# Patient Record
Sex: FEMALE | Race: BLACK OR AFRICAN AMERICAN | NOT HISPANIC OR LATINO | Employment: UNEMPLOYED | URBAN - METROPOLITAN AREA
[De-identification: names, ages, dates, MRNs, and addresses within clinical notes are randomized per-mention and may not be internally consistent; named-entity substitution may affect disease eponyms.]

---

## 2023-08-30 PROBLEM — M54.9 DORSODYNIA: Status: ACTIVE | Noted: 2023-08-30

## 2023-08-30 PROBLEM — E21.3 HYPERPARATHYROIDISM (MULTI): Status: ACTIVE | Noted: 2023-08-30

## 2023-08-30 PROBLEM — E55.9 VITAMIN D DEFICIENCY: Status: ACTIVE | Noted: 2023-08-30

## 2023-08-30 PROBLEM — B37.89 CANDIDIASIS OF BREAST: Status: ACTIVE | Noted: 2023-08-30

## 2023-08-30 PROBLEM — E66.01 MORBID OBESITY (MULTI): Status: ACTIVE | Noted: 2023-08-30

## 2023-08-30 PROBLEM — B96.89 BV (BACTERIAL VAGINOSIS): Status: ACTIVE | Noted: 2023-08-30

## 2023-08-30 PROBLEM — N62 LARGE BREASTS: Status: ACTIVE | Noted: 2023-08-30

## 2023-08-30 PROBLEM — Z98.890 POST-OPERATIVE STATE: Status: ACTIVE | Noted: 2023-08-30

## 2023-08-30 PROBLEM — N76.0 VAGINITIS: Status: ACTIVE | Noted: 2023-08-30

## 2023-08-30 PROBLEM — N93.0 POSTCOITAL BLEEDING: Status: ACTIVE | Noted: 2023-08-30

## 2023-08-30 PROBLEM — T81.89XA DELAYED SURGICAL WOUND HEALING: Status: ACTIVE | Noted: 2023-08-30

## 2023-08-30 PROBLEM — Z98.890 STATUS POST BILATERAL BREAST REDUCTION: Status: ACTIVE | Noted: 2023-08-30

## 2023-08-30 PROBLEM — L29.2 PRURITUS OF VULVA: Status: ACTIVE | Noted: 2023-08-30

## 2023-08-30 PROBLEM — O09.219 PREVIOUS PRETERM DELIVERY, ANTEPARTUM (HHS-HCC): Status: ACTIVE | Noted: 2023-08-30

## 2023-08-30 PROBLEM — A74.9 CHLAMYDIA: Status: ACTIVE | Noted: 2023-08-30

## 2023-08-30 PROBLEM — N76.0 BV (BACTERIAL VAGINOSIS): Status: ACTIVE | Noted: 2023-08-30

## 2023-08-30 PROBLEM — N72 CERVICITIS: Status: ACTIVE | Noted: 2023-08-30

## 2023-08-30 PROBLEM — K90.9 MALABSORPTION (HHS-HCC): Status: ACTIVE | Noted: 2023-08-30

## 2023-08-30 PROBLEM — M62.830 BACK MUSCLE SPASM: Status: ACTIVE | Noted: 2023-08-30

## 2023-08-30 PROBLEM — G56.00 CARPAL TUNNEL SYNDROME: Status: ACTIVE | Noted: 2023-08-30

## 2023-08-30 PROBLEM — N94.6 DYSMENORRHEA: Status: ACTIVE | Noted: 2023-08-30

## 2023-08-30 PROBLEM — I10 HYPERTENSION: Status: ACTIVE | Noted: 2023-08-30

## 2023-08-30 PROBLEM — B00.9 HSV (HERPES SIMPLEX VIRUS) INFECTION: Status: ACTIVE | Noted: 2023-08-30

## 2023-08-30 PROBLEM — O09.529 AMA (ADVANCED MATERNAL AGE) MULTIGRAVIDA 35+ (HHS-HCC): Status: ACTIVE | Noted: 2023-08-30

## 2023-09-11 PROBLEM — R87.611 PAP SMEAR OF CERVIX WITH ASCUS, CANNOT EXCLUDE HGSIL: Status: ACTIVE | Noted: 2023-09-11

## 2023-09-11 PROBLEM — R89.6: Status: ACTIVE | Noted: 2023-09-11

## 2023-09-11 RX ORDER — IBUPROFEN 800 MG/1
800 TABLET ORAL EVERY 6 HOURS PRN
COMMUNITY
Start: 2023-05-13 | End: 2024-02-22 | Stop reason: HOSPADM

## 2024-02-21 ENCOUNTER — APPOINTMENT (OUTPATIENT)
Dept: CARDIOLOGY | Facility: HOSPITAL | Age: 44
End: 2024-02-21
Payer: COMMERCIAL

## 2024-02-21 ENCOUNTER — HOSPITAL ENCOUNTER (INPATIENT)
Facility: HOSPITAL | Age: 44
LOS: 1 days | Discharge: HOME | End: 2024-02-22
Attending: INTERNAL MEDICINE | Admitting: INTERNAL MEDICINE
Payer: COMMERCIAL

## 2024-02-21 DIAGNOSIS — I21.4 NSTEMI (NON-ST ELEVATED MYOCARDIAL INFARCTION) (MULTI): Primary | ICD-10-CM

## 2024-02-21 DIAGNOSIS — R79.89 OTHER SPECIFIED ABNORMAL FINDINGS OF BLOOD CHEMISTRY: ICD-10-CM

## 2024-02-21 LAB
ANION GAP SERPL CALC-SCNC: 12 MMOL/L
APTT PPP: 56.7 SECONDS (ref 22–32.5)
ATRIAL RATE: 76 BPM
BUN SERPL-MCNC: 12 MG/DL (ref 8–25)
CALCIUM SERPL-MCNC: 10.1 MG/DL (ref 8.5–10.4)
CHLORIDE SERPL-SCNC: 100 MMOL/L (ref 97–107)
CO2 SERPL-SCNC: 26 MMOL/L (ref 24–31)
CREAT SERPL-MCNC: 0.6 MG/DL (ref 0.4–1.6)
CRP SERPL-MCNC: 0.6 MG/DL (ref 0–2)
EGFRCR SERPLBLD CKD-EPI 2021: >90 ML/MIN/1.73M*2
ERYTHROCYTE [DISTWIDTH] IN BLOOD BY AUTOMATED COUNT: 13.2 % (ref 11.5–14.5)
ERYTHROCYTE [SEDIMENTATION RATE] IN BLOOD BY WESTERGREN METHOD: 44 MM/H (ref 0–20)
GLUCOSE SERPL-MCNC: 92 MG/DL (ref 65–99)
HCT VFR BLD AUTO: 33 % (ref 36–46)
HGB BLD-MCNC: 10.7 G/DL (ref 12–16)
INR PPP: 1.1 (ref 0.9–1.2)
MCH RBC QN AUTO: 29.1 PG (ref 26–34)
MCHC RBC AUTO-ENTMCNC: 32.4 G/DL (ref 32–36)
MCV RBC AUTO: 90 FL (ref 80–100)
NRBC BLD-RTO: 0 /100 WBCS (ref 0–0)
P AXIS: 68 DEGREES
P OFFSET: 199 MS
P ONSET: 155 MS
PLATELET # BLD AUTO: 221 X10*3/UL (ref 150–450)
POTASSIUM SERPL-SCNC: 3.6 MMOL/L (ref 3.4–5.1)
PR INTERVAL: 126 MS
PROTHROMBIN TIME: 11.9 SECONDS (ref 9.3–12.7)
Q ONSET: 218 MS
QRS COUNT: 13 BEATS
QRS DURATION: 82 MS
QT INTERVAL: 376 MS
QTC CALCULATION(BAZETT): 423 MS
QTC FREDERICIA: 406 MS
R AXIS: -10 DEGREES
RBC # BLD AUTO: 3.68 X10*6/UL (ref 4–5.2)
SODIUM SERPL-SCNC: 138 MMOL/L (ref 133–145)
T AXIS: 1 DEGREES
T OFFSET: 406 MS
TROPONIN T SERPL-MCNC: 500 NG/L
VENTRICULAR RATE: 76 BPM
WBC # BLD AUTO: 6.3 X10*3/UL (ref 4.4–11.3)

## 2024-02-21 PROCEDURE — 2500000005 HC RX 250 GENERAL PHARMACY W/O HCPCS: Performed by: INTERNAL MEDICINE

## 2024-02-21 PROCEDURE — 4A023N7 MEASUREMENT OF CARDIAC SAMPLING AND PRESSURE, LEFT HEART, PERCUTANEOUS APPROACH: ICD-10-PCS | Performed by: INTERNAL MEDICINE

## 2024-02-21 PROCEDURE — 93010 ELECTROCARDIOGRAM REPORT: CPT | Performed by: INTERNAL MEDICINE

## 2024-02-21 PROCEDURE — 36415 COLL VENOUS BLD VENIPUNCTURE: CPT | Performed by: INTERNAL MEDICINE

## 2024-02-21 PROCEDURE — 85027 COMPLETE CBC AUTOMATED: CPT | Performed by: INTERNAL MEDICINE

## 2024-02-21 PROCEDURE — 2060000001 HC INTERMEDIATE ICU ROOM DAILY

## 2024-02-21 PROCEDURE — 80048 BASIC METABOLIC PNL TOTAL CA: CPT | Performed by: INTERNAL MEDICINE

## 2024-02-21 PROCEDURE — 2500000001 HC RX 250 WO HCPCS SELF ADMINISTERED DRUGS (ALT 637 FOR MEDICARE OP): Performed by: INTERNAL MEDICINE

## 2024-02-21 PROCEDURE — B2111ZZ FLUOROSCOPY OF MULTIPLE CORONARY ARTERIES USING LOW OSMOLAR CONTRAST: ICD-10-PCS | Performed by: INTERNAL MEDICINE

## 2024-02-21 PROCEDURE — 93005 ELECTROCARDIOGRAM TRACING: CPT

## 2024-02-21 PROCEDURE — 85652 RBC SED RATE AUTOMATED: CPT | Performed by: INTERNAL MEDICINE

## 2024-02-21 PROCEDURE — 2720000007 HC OR 272 NO HCPCS: Performed by: INTERNAL MEDICINE

## 2024-02-21 PROCEDURE — C9113 INJ PANTOPRAZOLE SODIUM, VIA: HCPCS | Performed by: INTERNAL MEDICINE

## 2024-02-21 PROCEDURE — 85347 COAGULATION TIME ACTIVATED: CPT

## 2024-02-21 PROCEDURE — 85610 PROTHROMBIN TIME: CPT | Performed by: INTERNAL MEDICINE

## 2024-02-21 PROCEDURE — 2500000004 HC RX 250 GENERAL PHARMACY W/ HCPCS (ALT 636 FOR OP/ED): Performed by: INTERNAL MEDICINE

## 2024-02-21 PROCEDURE — 99152 MOD SED SAME PHYS/QHP 5/>YRS: CPT | Performed by: INTERNAL MEDICINE

## 2024-02-21 PROCEDURE — 96366 THER/PROPH/DIAG IV INF ADDON: CPT | Mod: 59

## 2024-02-21 PROCEDURE — C1894 INTRO/SHEATH, NON-LASER: HCPCS | Performed by: INTERNAL MEDICINE

## 2024-02-21 PROCEDURE — 86140 C-REACTIVE PROTEIN: CPT | Performed by: INTERNAL MEDICINE

## 2024-02-21 PROCEDURE — 84484 ASSAY OF TROPONIN QUANT: CPT | Performed by: INTERNAL MEDICINE

## 2024-02-21 PROCEDURE — 93458 L HRT ARTERY/VENTRICLE ANGIO: CPT | Performed by: INTERNAL MEDICINE

## 2024-02-21 PROCEDURE — C1887 CATHETER, GUIDING: HCPCS | Performed by: INTERNAL MEDICINE

## 2024-02-21 PROCEDURE — 2550000001 HC RX 255 CONTRASTS: Performed by: INTERNAL MEDICINE

## 2024-02-21 PROCEDURE — G0378 HOSPITAL OBSERVATION PER HR: HCPCS

## 2024-02-21 PROCEDURE — 99222 1ST HOSP IP/OBS MODERATE 55: CPT | Performed by: INTERNAL MEDICINE

## 2024-02-21 PROCEDURE — C1760 CLOSURE DEV, VASC: HCPCS | Performed by: INTERNAL MEDICINE

## 2024-02-21 PROCEDURE — 96375 TX/PRO/DX INJ NEW DRUG ADDON: CPT | Mod: 59

## 2024-02-21 PROCEDURE — 96361 HYDRATE IV INFUSION ADD-ON: CPT | Mod: 59

## 2024-02-21 PROCEDURE — 96365 THER/PROPH/DIAG IV INF INIT: CPT | Mod: 59

## 2024-02-21 RX ORDER — FENTANYL CITRATE 50 UG/ML
INJECTION, SOLUTION INTRAMUSCULAR; INTRAVENOUS AS NEEDED
Status: DISCONTINUED | OUTPATIENT
Start: 2024-02-21 | End: 2024-02-21 | Stop reason: HOSPADM

## 2024-02-21 RX ORDER — IODIXANOL 320 MG/ML
INJECTION, SOLUTION INTRAVASCULAR AS NEEDED
Status: DISCONTINUED | OUTPATIENT
Start: 2024-02-21 | End: 2024-02-21 | Stop reason: HOSPADM

## 2024-02-21 RX ORDER — ATORVASTATIN CALCIUM 80 MG/1
80 TABLET, FILM COATED ORAL NIGHTLY
Status: DISCONTINUED | OUTPATIENT
Start: 2024-02-21 | End: 2024-02-21

## 2024-02-21 RX ORDER — NITROGLYCERIN 20 MG/100ML
20 INJECTION INTRAVENOUS CONTINUOUS
Status: DISCONTINUED | OUTPATIENT
Start: 2024-02-21 | End: 2024-02-21

## 2024-02-21 RX ORDER — PANTOPRAZOLE SODIUM 40 MG/10ML
40 INJECTION, POWDER, LYOPHILIZED, FOR SOLUTION INTRAVENOUS
Status: DISCONTINUED | OUTPATIENT
Start: 2024-02-21 | End: 2024-02-22 | Stop reason: HOSPADM

## 2024-02-21 RX ORDER — ONDANSETRON 4 MG/1
4 TABLET, ORALLY DISINTEGRATING ORAL EVERY 8 HOURS PRN
Status: DISCONTINUED | OUTPATIENT
Start: 2024-02-21 | End: 2024-02-22 | Stop reason: HOSPADM

## 2024-02-21 RX ORDER — MORPHINE SULFATE 2 MG/ML
2 INJECTION, SOLUTION INTRAMUSCULAR; INTRAVENOUS EVERY 5 MIN PRN
Status: DISCONTINUED | OUTPATIENT
Start: 2024-02-21 | End: 2024-02-21

## 2024-02-21 RX ORDER — NAPROXEN SODIUM 220 MG/1
81 TABLET, FILM COATED ORAL DAILY
Status: DISCONTINUED | OUTPATIENT
Start: 2024-02-21 | End: 2024-02-21

## 2024-02-21 RX ORDER — ONDANSETRON HYDROCHLORIDE 2 MG/ML
4 INJECTION, SOLUTION INTRAVENOUS EVERY 8 HOURS PRN
Status: DISCONTINUED | OUTPATIENT
Start: 2024-02-21 | End: 2024-02-22 | Stop reason: HOSPADM

## 2024-02-21 RX ORDER — HEPARIN SODIUM 10000 [USP'U]/100ML
0-4000 INJECTION, SOLUTION INTRAVENOUS CONTINUOUS
Status: DISCONTINUED | OUTPATIENT
Start: 2024-02-21 | End: 2024-02-21

## 2024-02-21 RX ORDER — GUAIFENESIN/DEXTROMETHORPHAN 100-10MG/5
5 SYRUP ORAL EVERY 4 HOURS PRN
Status: DISCONTINUED | OUTPATIENT
Start: 2024-02-21 | End: 2024-02-22 | Stop reason: HOSPADM

## 2024-02-21 RX ORDER — DEXTROSE MONOHYDRATE AND SODIUM CHLORIDE 5; .45 G/100ML; G/100ML
100 INJECTION, SOLUTION INTRAVENOUS CONTINUOUS
Status: DISCONTINUED | OUTPATIENT
Start: 2024-02-21 | End: 2024-02-22 | Stop reason: HOSPADM

## 2024-02-21 RX ORDER — HEPARIN SODIUM 5000 [USP'U]/ML
2000-4000 INJECTION, SOLUTION INTRAVENOUS; SUBCUTANEOUS AS NEEDED
Status: DISCONTINUED | OUTPATIENT
Start: 2024-02-21 | End: 2024-02-21

## 2024-02-21 RX ORDER — PANTOPRAZOLE SODIUM 40 MG/1
40 TABLET, DELAYED RELEASE ORAL
Status: DISCONTINUED | OUTPATIENT
Start: 2024-02-21 | End: 2024-02-22 | Stop reason: HOSPADM

## 2024-02-21 RX ORDER — POLYETHYLENE GLYCOL 3350 17 G/17G
17 POWDER, FOR SOLUTION ORAL DAILY
Status: DISCONTINUED | OUTPATIENT
Start: 2024-02-21 | End: 2024-02-22 | Stop reason: HOSPADM

## 2024-02-21 RX ORDER — GUAIFENESIN 600 MG/1
600 TABLET, EXTENDED RELEASE ORAL EVERY 12 HOURS PRN
Status: DISCONTINUED | OUTPATIENT
Start: 2024-02-21 | End: 2024-02-22 | Stop reason: HOSPADM

## 2024-02-21 RX ORDER — DOCUSATE SODIUM 100 MG/1
100 CAPSULE, LIQUID FILLED ORAL 2 TIMES DAILY
Status: DISCONTINUED | OUTPATIENT
Start: 2024-02-21 | End: 2024-02-22 | Stop reason: HOSPADM

## 2024-02-21 RX ORDER — HEPARIN SODIUM 5000 [USP'U]/ML
4000 INJECTION, SOLUTION INTRAVENOUS; SUBCUTANEOUS ONCE
Status: DISCONTINUED | OUTPATIENT
Start: 2024-02-21 | End: 2024-02-21

## 2024-02-21 RX ORDER — METOPROLOL TARTRATE 25 MG/1
25 TABLET, FILM COATED ORAL 4 TIMES DAILY
Status: DISCONTINUED | OUTPATIENT
Start: 2024-02-21 | End: 2024-02-21

## 2024-02-21 RX ORDER — ACETAMINOPHEN 650 MG/1
650 SUPPOSITORY RECTAL EVERY 4 HOURS PRN
Status: DISCONTINUED | OUTPATIENT
Start: 2024-02-21 | End: 2024-02-21

## 2024-02-21 RX ORDER — MIDAZOLAM HYDROCHLORIDE 1 MG/ML
INJECTION, SOLUTION INTRAMUSCULAR; INTRAVENOUS AS NEEDED
Status: DISCONTINUED | OUTPATIENT
Start: 2024-02-21 | End: 2024-02-21 | Stop reason: HOSPADM

## 2024-02-21 RX ORDER — ACETAMINOPHEN 325 MG/1
650 TABLET ORAL EVERY 4 HOURS PRN
Status: DISCONTINUED | OUTPATIENT
Start: 2024-02-21 | End: 2024-02-22 | Stop reason: HOSPADM

## 2024-02-21 RX ORDER — ACETAMINOPHEN 160 MG/5ML
650 SOLUTION ORAL EVERY 4 HOURS PRN
Status: DISCONTINUED | OUTPATIENT
Start: 2024-02-21 | End: 2024-02-21

## 2024-02-21 RX ORDER — DEXTROSE MONOHYDRATE, SODIUM CHLORIDE, AND POTASSIUM CHLORIDE 50; 1.49; 4.5 G/1000ML; G/1000ML; G/1000ML
100 INJECTION, SOLUTION INTRAVENOUS CONTINUOUS
Status: DISCONTINUED | OUTPATIENT
Start: 2024-02-21 | End: 2024-02-22 | Stop reason: HOSPADM

## 2024-02-21 RX ORDER — TALC
3 POWDER (GRAM) TOPICAL NIGHTLY
Status: DISCONTINUED | OUTPATIENT
Start: 2024-02-21 | End: 2024-02-22 | Stop reason: HOSPADM

## 2024-02-21 RX ORDER — LIDOCAINE HYDROCHLORIDE 10 MG/ML
INJECTION, SOLUTION EPIDURAL; INFILTRATION; INTRACAUDAL; PERINEURAL AS NEEDED
Status: DISCONTINUED | OUTPATIENT
Start: 2024-02-21 | End: 2024-02-21 | Stop reason: HOSPADM

## 2024-02-21 RX ORDER — FAMOTIDINE 20 MG/1
20 TABLET, FILM COATED ORAL ONCE
Status: COMPLETED | OUTPATIENT
Start: 2024-02-21 | End: 2024-02-21

## 2024-02-21 RX ORDER — DIPHENHYDRAMINE HCL 50 MG
50 CAPSULE ORAL ONCE
Status: DISCONTINUED | OUTPATIENT
Start: 2024-02-21 | End: 2024-02-22 | Stop reason: HOSPADM

## 2024-02-21 RX ADMIN — ONDANSETRON 4 MG: 4 TABLET, ORALLY DISINTEGRATING ORAL at 12:11

## 2024-02-21 RX ADMIN — ACETAMINOPHEN 650 MG: 325 TABLET ORAL at 12:11

## 2024-02-21 RX ADMIN — DEXTROSE MONOHYDRATE, SODIUM CHLORIDE, AND POTASSIUM CHLORIDE 100 ML/HR: 50; 4.5; 1.49 INJECTION, SOLUTION INTRAVENOUS at 05:15

## 2024-02-21 RX ADMIN — FAMOTIDINE 20 MG: 20 TABLET, FILM COATED ORAL at 12:11

## 2024-02-21 RX ADMIN — NITROGLYCERIN 20 MCG/MIN: 20 INJECTION INTRAVENOUS at 01:34

## 2024-02-21 RX ADMIN — ATORVASTATIN CALCIUM 80 MG: 80 TABLET, FILM COATED ORAL at 01:33

## 2024-02-21 RX ADMIN — Medication 3 MG: at 01:33

## 2024-02-21 RX ADMIN — DOCUSATE SODIUM 100 MG: 100 CAPSULE, LIQUID FILLED ORAL at 21:15

## 2024-02-21 RX ADMIN — ONDANSETRON 4 MG: 2 INJECTION INTRAMUSCULAR; INTRAVENOUS at 07:04

## 2024-02-21 RX ADMIN — METOPROLOL TARTRATE 25 MG: 25 TABLET, FILM COATED ORAL at 01:33

## 2024-02-21 RX ADMIN — MORPHINE SULFATE 2 MG: 2 INJECTION, SOLUTION INTRAMUSCULAR; INTRAVENOUS at 05:15

## 2024-02-21 RX ADMIN — NITROGLYCERIN 10 MCG/MIN: 20 INJECTION INTRAVENOUS at 08:26

## 2024-02-21 RX ADMIN — Medication 3 MG: at 21:15

## 2024-02-21 RX ADMIN — HEPARIN SODIUM AND DEXTROSE 1000 UNITS/HR: 10000; 5 INJECTION INTRAVENOUS at 01:53

## 2024-02-21 RX ADMIN — PANTOPRAZOLE SODIUM 40 MG: 40 INJECTION, POWDER, FOR SOLUTION INTRAVENOUS at 05:15

## 2024-02-21 SDOH — SOCIAL STABILITY: SOCIAL INSECURITY: ARE THERE ANY APPARENT SIGNS OF INJURIES/BEHAVIORS THAT COULD BE RELATED TO ABUSE/NEGLECT?: NO

## 2024-02-21 SDOH — SOCIAL STABILITY: SOCIAL INSECURITY: DOES ANYONE TRY TO KEEP YOU FROM HAVING/CONTACTING OTHER FRIENDS OR DOING THINGS OUTSIDE YOUR HOME?: NO

## 2024-02-21 SDOH — SOCIAL STABILITY: SOCIAL INSECURITY: DO YOU FEEL ANYONE HAS EXPLOITED OR TAKEN ADVANTAGE OF YOU FINANCIALLY OR OF YOUR PERSONAL PROPERTY?: NO

## 2024-02-21 SDOH — SOCIAL STABILITY: SOCIAL INSECURITY: HAS ANYONE EVER THREATENED TO HURT YOUR FAMILY OR YOUR PETS?: NO

## 2024-02-21 SDOH — SOCIAL STABILITY: SOCIAL INSECURITY: DO YOU FEEL UNSAFE GOING BACK TO THE PLACE WHERE YOU ARE LIVING?: NO

## 2024-02-21 SDOH — SOCIAL STABILITY: SOCIAL INSECURITY: ABUSE: ADULT

## 2024-02-21 SDOH — SOCIAL STABILITY: SOCIAL INSECURITY: ARE YOU OR HAVE YOU BEEN THREATENED OR ABUSED PHYSICALLY, EMOTIONALLY, OR SEXUALLY BY ANYONE?: NO

## 2024-02-21 SDOH — SOCIAL STABILITY: SOCIAL INSECURITY: WERE YOU ABLE TO COMPLETE ALL THE BEHAVIORAL HEALTH SCREENINGS?: YES

## 2024-02-21 SDOH — SOCIAL STABILITY: SOCIAL INSECURITY: HAVE YOU HAD THOUGHTS OF HARMING ANYONE ELSE?: NO

## 2024-02-21 ASSESSMENT — ENCOUNTER SYMPTOMS
DIZZINESS: 0
EYE PAIN: 0
HYPERACTIVE: 0
NECK STIFFNESS: 0
NECK PAIN: 0
CONSTIPATION: 0
DIFFICULTY URINATING: 0
SHORTNESS OF BREATH: 0
PHOTOPHOBIA: 0
DIAPHORESIS: 0
SPEECH DIFFICULTY: 0
ABDOMINAL DISTENTION: 0
CONFUSION: 0
BRUISES/BLEEDS EASILY: 0
LIGHT-HEADEDNESS: 0
AGITATION: 0
DYSPHORIC MOOD: 0
TROUBLE SWALLOWING: 0
CHEST TIGHTNESS: 0
NAUSEA: 0
JOINT SWELLING: 0
SORE THROAT: 0
VOICE CHANGE: 0
SINUS PRESSURE: 0
NUMBNESS: 0
ARTHRALGIAS: 0
BLOOD IN STOOL: 0
SLEEP DISTURBANCE: 0
APNEA: 0
FACIAL ASYMMETRY: 0
DYSURIA: 0
RECTAL PAIN: 0
EYE REDNESS: 0
WEAKNESS: 0
COLOR CHANGE: 0
FREQUENCY: 0
RHINORRHEA: 0
FATIGUE: 0
ADENOPATHY: 0
POLYPHAGIA: 0
POLYDIPSIA: 0
VOMITING: 0
EYE DISCHARGE: 0
UNEXPECTED WEIGHT CHANGE: 0
WOUND: 0
MYALGIAS: 0
DIARRHEA: 0
HALLUCINATIONS: 0
TREMORS: 0
ANAL BLEEDING: 0
FLANK PAIN: 0
EYE ITCHING: 0
BACK PAIN: 0
FEVER: 0
DECREASED CONCENTRATION: 0
FACIAL SWELLING: 0
STRIDOR: 0
SINUS PAIN: 0
CHOKING: 0
HEADACHES: 0
ABDOMINAL PAIN: 0
CHILLS: 0
ACTIVITY CHANGE: 0
SEIZURES: 0
WHEEZING: 0
APPETITE CHANGE: 0
HEMATURIA: 0
NERVOUS/ANXIOUS: 0
COUGH: 0
PALPITATIONS: 0

## 2024-02-21 ASSESSMENT — PAIN - FUNCTIONAL ASSESSMENT
PAIN_FUNCTIONAL_ASSESSMENT: 0-10

## 2024-02-21 ASSESSMENT — COGNITIVE AND FUNCTIONAL STATUS - GENERAL
DAILY ACTIVITIY SCORE: 24
MOBILITY SCORE: 24
PATIENT BASELINE BEDBOUND: NO
DAILY ACTIVITIY SCORE: 24
DAILY ACTIVITIY SCORE: 24
MOBILITY SCORE: 24
MOBILITY SCORE: 24

## 2024-02-21 ASSESSMENT — LIFESTYLE VARIABLES
AUDIT-C TOTAL SCORE: 0
PRESCIPTION_ABUSE_PAST_12_MONTHS: NO
HOW OFTEN DO YOU HAVE 6 OR MORE DRINKS ON ONE OCCASION: NEVER
SUBSTANCE_ABUSE_PAST_12_MONTHS: NO
AUDIT-C TOTAL SCORE: 0
HOW OFTEN DO YOU HAVE A DRINK CONTAINING ALCOHOL: NEVER
HOW MANY STANDARD DRINKS CONTAINING ALCOHOL DO YOU HAVE ON A TYPICAL DAY: PATIENT DOES NOT DRINK
SKIP TO QUESTIONS 9-10: 1

## 2024-02-21 ASSESSMENT — ACTIVITIES OF DAILY LIVING (ADL)
LACK_OF_TRANSPORTATION: NO
TOILETING: INDEPENDENT
FEEDING YOURSELF: INDEPENDENT
HEARING - LEFT EAR: FUNCTIONAL
PATIENT'S MEMORY ADEQUATE TO SAFELY COMPLETE DAILY ACTIVITIES?: YES
BATHING: INDEPENDENT
JUDGMENT_ADEQUATE_SAFELY_COMPLETE_DAILY_ACTIVITIES: YES
GROOMING: INDEPENDENT
HEARING - RIGHT EAR: FUNCTIONAL
WALKS IN HOME: INDEPENDENT
ADEQUATE_TO_COMPLETE_ADL: YES
DRESSING YOURSELF: INDEPENDENT

## 2024-02-21 ASSESSMENT — PAIN SCALES - GENERAL
PAINLEVEL_OUTOF10: 0 - NO PAIN
PAINLEVEL_OUTOF10: 8
PAINLEVEL_OUTOF10: 0 - NO PAIN
PAINLEVEL_OUTOF10: 8
PAINLEVEL_OUTOF10: 8
PAINLEVEL_OUTOF10: 0 - NO PAIN
PAINLEVEL_OUTOF10: 2

## 2024-02-21 ASSESSMENT — COLUMBIA-SUICIDE SEVERITY RATING SCALE - C-SSRS
2. HAVE YOU ACTUALLY HAD ANY THOUGHTS OF KILLING YOURSELF?: NO
6. HAVE YOU EVER DONE ANYTHING, STARTED TO DO ANYTHING, OR PREPARED TO DO ANYTHING TO END YOUR LIFE?: NO
1. IN THE PAST MONTH, HAVE YOU WISHED YOU WERE DEAD OR WISHED YOU COULD GO TO SLEEP AND NOT WAKE UP?: NO

## 2024-02-21 ASSESSMENT — PATIENT HEALTH QUESTIONNAIRE - PHQ9
1. LITTLE INTEREST OR PLEASURE IN DOING THINGS: NOT AT ALL
SUM OF ALL RESPONSES TO PHQ9 QUESTIONS 1 & 2: 0
2. FEELING DOWN, DEPRESSED OR HOPELESS: NOT AT ALL

## 2024-02-21 ASSESSMENT — PAIN DESCRIPTION - DESCRIPTORS
DESCRIPTORS: ACHING
DESCRIPTORS: ACHING

## 2024-02-21 NOTE — CARE PLAN
The patient's goals for the shift include No chest pain    The clinical goals for the shift include heart cath    Over the shift, the patient did not make progress toward the following goals. Barriers to progression include none. Recommendations to address these barriers include none.

## 2024-02-21 NOTE — BRIEF OP NOTE
Physician Transition of Care Summary  Invasive Cardiovascular Lab    Procedure Date: 2/21/2024  Attending:    * Arnie Mohr - Primary  Resident/Fellow/Other Assistant: Surgeon(s) and Role:    Indications:   Pre-op Diagnosis     * NSTEMI (non-ST elevated myocardial infarction) (CMS/[facility]) [I21.4]    Post-procedure diagnosis:   Post-op Diagnosis     * Elevated troponin level not due to acute coronary syndrome [R79.89]    Procedure(s):   Left Heart Cath  82504 - PA L HRT CATH W/NJX L VENTRICULOGRAPHY IMG S&I        Procedure Findings:   Tortious coronary arteries  No significant CAD    Description of the Procedure:   See cardiac cath report    Complications:   none    Stents/Implants:       Anticoagulation/Antiplatelet Plan:   none    Estimated Blood Loss:   5 mL    Anesthesia: Moderate Sedation Anesthesia Staff: No anesthesia staff entered.    Any Specimen(s) Removed:   No specimens collected during this procedure.    Disposition:   To telemetry      Electronically signed by: Arnie Mohr MD, 2/21/2024 9:43 AM

## 2024-02-21 NOTE — NURSING NOTE
Started Nitroglycerin infusion as ordered with initial dose of 5mcg/hour, patient stated pain level at 3/10 and not demanding any pain medications at this time.

## 2024-02-21 NOTE — NURSING NOTE
Returned from [facility].  Patient placed on hardwire tele.  EKG post cath completed and placed in chart.  Patient NSR on tele.  No complaints of pain/discomfort.  Respirations even and unlabored on RA.  TR band to R wrist observed with 14mL of air in place.  No bleeding or hematoma observed.  Circulation checks WNL.

## 2024-02-21 NOTE — NURSING NOTE
Assumed care of patient.  Resting quietly in bed, awake and alert.  Heparin gtt and nitroglycerin gtt infusing as ordered.  No complaints of chest pain or SOB.  Await cardio consult for further orders.  Call light in reach.  Bed locked and in low position.

## 2024-02-21 NOTE — NURSING NOTE
1140:  removed 3 mL of air from TR band.  1150:  TR band removed, scant amount of bleeding noted.  Pressure held.  CDD applied.      Call light in reach.  Dr Mohr rounding on patient.    Patient assisted to bathroom to urinate.

## 2024-02-21 NOTE — CONSULTS
Consults  History Of Present Illness:    Floresita Shannon is a 43 y.o. female presenting with medical history significant for history of morbid obesity for which she underwent gastric bypass in 2021 at [facility] by Dr. Esequiel Manzanares, history of bilateral breast reduction surgery in 219 presents with symptoms of chest discomfort to emergency room at [facility] yesterday afternoon.  Patient forms later today morning she went to car shopping and when she was walking on the plot looking at different car systolic symptoms of chest discomfort.  She also had some the shortness of breath.  She sat for a few minutes and then had a glass of water.  She continued to have symptoms of chest discomfort which started the heartburn went home and took some Tums and without any relief.  Her  encouraged her to go to the emergency room.  She was then seen in the [facility] hospital emergency room where she was noted to have slightly elevated troponins.  But over the next few hours these troponin gradually increased and patient continued to have symptoms of chest discomfort at which time patient was being transferred to our facility.  Patient was recommended to receive Brilinta 180 mg loading dose, Nitropaste 2 inches and the patient was already on IV heparin and was transferred to our facility last night.  Patient continues to have symptoms of chest pain 6 out of 10 in severity at time of evaluation.  She is on nitroglycerin drip initially at 5 mics per and that has been increased.    EKG performed showed sinus rhythm with nonspecific T-segment changes.    Recorded Vitals:  Vitals:    02/21/24 0833 02/21/24 0847 02/21/24 0906 02/21/24 0906   BP: (!) 136/97 (!) 126/91  (!) 140/96   BP Location:       Patient Position:       Pulse:    83   Resp:    13   Temp:       TempSrc:       SpO2:   100% 100%   Weight:       Height:           Last Labs:  CBC - 2/21/2024:  3:16 AM  6.3 10.7 221    33.0      CMP - 2/21/2024:  3:16  AM  10.1 7.0 12 --- 0.4   3.2 4.1 9 54      PTT - 2/21/2024:  5:24 AM  1.1   11.9 56.7     Hemoglobin A1C   Date/Time Value Ref Range Status   06/29/2021 02:15 PM 5.1 4.0 - 6.0 % Final     Comment:     Hemoglobin A1C levels are related to mean blood glucose during the   preceding 2-3 months. The relationship table below may be used as a   general guide. Each 1% increase in HGB A1C is a reflection of an   increase in mean glucose of approximately 30 mg/dl.   Reference: Diabetes Care, volume 29, supplement 1 Jan. 2006                        HGB A1C ................. Approx. Mean Glucose   _______________________________________________   6%   ...............................  120 mg/dl   7%   ...............................  150 mg/dl   8%   ...............................  180 mg/dl   9%   ...............................  210 mg/dl   10%  ...............................  240 mg/dl  Performed at [address] [address]     06/01/2020 09:51 AM 5.4 % Final     Comment:          Diagnosis of Diabetes-Adults   Non-Diabetic: < or = 5.6%   Increased risk for developing diabetes: 5.7-6.4%   Diagnostic of diabetes: > or = 6.5%  .       Monitoring of Diabetes                Age (y)     Therapeutic Goal (%)   Adults:          >18           <7.0   Pediatrics:    13-18           <7.5                   7-12           <8.0                   0- 6            7.5-8.5   American Diabetes Association. Diabetes Care 33(S1), Jan 2010.       LDL Calculated   Date/Time Value Ref Range Status   06/22/2022 10:23 AM 99 65 - 130 MG/DL Final   06/29/2021 02:15  65 - 130 MG/DL Final     VLDL   Date/Time Value Ref Range Status   05/20/2020 01:54 PM 17 0 - 40 mg/dL Final   04/11/2018 11:16 AM 10 0 - 40 mg/dL Final      Last I/O:  No intake/output data recorded.    Past Cardiology Tests (Last 3 Years):  EKG:  Electrocardiogram, 12-lead PRN ACS symptoms 02/21/2024 (Preliminary)    Echo:  No results found for this or any  "previous visit from the past 1095 days.    Ejection Fractions:  No results found for: \"EF\"  Cath:  No results found for this or any previous visit from the past 1095 days.    Stress Test:  No results found for this or any previous visit from the past 1095 days.    Cardiac Imaging:  No results found for this or any previous visit from the past 1095 days.      Past Medical History:  She has a past medical history of Anemia complicating pregnancy, second trimester (07/12/2016), Diseases of the digestive system complicating pregnancy, unspecified trimester (07/12/2016), Disorder of the skin and subcutaneous tissue, unspecified (03/11/2016), Headache, unspecified, Hypertension, Hypertrophy of breast (11/27/2018), Maternal care for other (suspected) fetal abnormality and damage, fetal cardiac anomalies, not applicable or unspecified (06/09/2016), Morbid obesity (CMS/[facility]), Other acute postprocedural pain (04/02/2019), Other conditions influencing health status, Other specified health status (06/09/2016), Personal history of diseases of the blood and blood-forming organs and certain disorders involving the immune mechanism (05/27/2020), and Sleep apnea, unspecified (07/16/2020).    Past Surgical History:  Gastric bypass surgery in 2021 by Dr. Esequiel Manzanares at our hospital and history of bilateral breast reduction surgery in 2018      Social History:  She reports that she does not currently use alcohol after a past usage of about 1.0 standard drink of alcohol per week. She reports that she does not use drugs. No history on file for tobacco use.  She is  and lives in [place].  She has 5 children.  She lives with her .    Family History:  Family History   Problem Relation Name Age of Onset   • No Known Problems Mother     • No Known Problems Father          Allergies:  Patient has no known allergies.    Inpatient Medications:  Scheduled medications   Medication Dose Route Frequency   • aspirin  81 mg oral Daily   • " atorvastatin  80 mg oral Nightly   • diphenhydrAMINE  50 mg oral Once   • docusate sodium  100 mg oral BID   • famotidine  20 mg oral Once   • heparin (porcine)  4,000 Units intravenous Once   • melatonin  3 mg oral Nightly   • metoprolol tartrate  25 mg oral 4x daily   • oxygen   inhalation Continuous - 02/gases   • pantoprazole  40 mg oral Daily before breakfast    Or   • pantoprazole  40 mg intravenous Daily before breakfast   • polyethylene glycol  17 g oral Daily   • predniSONE  50 mg oral q6h     PRN medications   Medication   • acetaminophen    Or   • acetaminophen    Or   • acetaminophen   • benzocaine-menthol   • dextromethorphan-guaifenesin   • guaiFENesin   • heparin (porcine)   • morphine   • ondansetron ODT    Or   • ondansetron   • oxygen     Continuous Medications   Medication Dose Last Rate   • potassium hnipnmd-G8-0.45%NaCl  100 mL/hr 100 mL/hr (02/21/24 0515)   • heparin  0-4,000 Units/hr 1,000 Units/hr (02/21/24 0618)   • nitroglycerin  20 mcg/min 20 mcg/min (02/21/24 0847)     Outpatient Medications:  Current Outpatient Medications   Medication Instructions   • acetaminophen (TylenoL) 325 mg tablet Every 4 hours   • acyclovir (Zovirax) 400 mg tablet Every 24 hours   • calcium cit/mag/D3/Zn//roger (CALCIUM CITRATE PLUS ORAL) as directed Orally   • cholecalciferol (Vitamin D-3) 25 MCG (1000 UT) tablet 1 tablet, oral, Daily   • cholecalciferol (Vitamin D-3) 50 MCG (2000 UT) tablet Every 24 hours   • cyclobenzaprine (Flexeril) 10 mg tablet 1 tablet, oral, Nightly   • ferrous sulfate 325 (65 Fe) MG EC tablet 1 tablet Orally once a day with meals for 30 day(s)   • ibuprofen 800 mg, oral, Every 6 hours PRN, FOR PAIN   • medroxyPROGESTERone 150 mg/mL injection intramuscular   • omeprazole (PriLOSEC) 40 mg DR capsule Every 24 hours   • pedi multivit 43-iron fumarate (Flintstones Complete, iron,) 18 mg iron tablet,chewable Every 24 hours   • triamterene-hydrochlorothiazid (Maxzide-25) 37.5-25 mg tablet 1  tablet, oral, Daily   • ursodiol (Actigall) 300 mg capsule 1 capsule, Every 12 hours       Physical Exam:  HEENT PRL neck is supple lungs clear to auscultation bilaterally with good air entry heart S1-S2 present with no murmurs abdomen soft and nontender EXTR shows no evidence of    Neurologic nonfocal she does have multiple tattoo marks both on her anterior aspect of the torso and both on her arms.     Assessment/Plan   1 acute non-ST elevation myocardial infarction with patient continues to have postinfarction angina in spite of being on IV nitroglycerin drip.  Patient be taken cardiac Cath Lab on emergency basis.  I have obtained informed consent of the patient and explained the patient risk and benefits of the procedure including but not limited to minor major risk of infection bleeding allergy to dye, renal failure CVA myocardial infarction possible coronary artery bypass surgery and death as but she clearly understands and consent to proceed with this plan.  Further management options will be based upon the findings of left heart catheterization.  Peripheral IV 02/20/24 20 G Left Antecubital (Active)   Site Assessment Clean;Dry;Intact 02/21/24 0831   Number of days: 1       Peripheral IV 02/20/24 20 G Right Antecubital (Active)   Site Assessment Clean;Dry;Intact 02/21/24 0831   Line Status Infusing 02/21/24 0831   Number of days: 1       Code Status:  Full Code    I spent 32 minutes in the professional and overall care of this patient.        Arnie Mohr MD

## 2024-02-21 NOTE — H&P
History Of Present Illness  Floresita Shannon is a 43 y.o. female presenting with chest pain.  Patient was yesterday at [facility] buying the car.  She suddenly started having chest pain did not feel good she left there and went to the drugstore and bought Tums but that did not relieve her chest pain so went to the [facility].  She was found to have elevated troponin which were trending up.  Patient was called non-ST elevation MI and transferred here for further workup.  Patient did not have any EKG changes.  She was started on nitro drip and heparin drip.  She went to the cardiac cath this morning.  She has history of gastric sleeve     Past Medical History  Past Medical History:   Diagnosis Date    Anemia complicating pregnancy, second trimester 07/12/2016    Anemia of pregnancy in second trimester    Diseases of the digestive system complicating pregnancy, unspecified trimester 07/12/2016    Constipation in pregnancy    Disorder of the skin and subcutaneous tissue, unspecified 03/11/2016    Skin lesion    Headache, unspecified     Generalized headaches    Hypertension     Hypertrophy of breast 11/27/2018    Macromastia    Maternal care for other (suspected) fetal abnormality and damage, fetal cardiac anomalies, not applicable or unspecified 06/09/2016    Abnormal fetal echocardiogram affecting antepartum care of mother    Morbid obesity (CMS/HCC)     Other acute postprocedural pain 04/02/2019    Post-op pain    Other conditions influencing health status     H/O pregnancy    Other specified health status 06/09/2016    No pertinent past medical history    Personal history of diseases of the blood and blood-forming organs and certain disorders involving the immune mechanism 05/27/2020    History of anemia    Sleep apnea, unspecified 07/16/2020    Sleep-disordered breathing       Surgical History  Past Surgical History:   Procedure Laterality Date    ABDOMINAL SURGERY      OTHER SURGICAL HISTORY  08/20/2014     Gynecologic Services Intrauterine Device (IUD) Removal        Social History  She reports that she has never smoked. She has never been exposed to tobacco smoke. She has never used smokeless tobacco. She reports that she does not currently use alcohol after a past usage of about 1.0 standard drink of alcohol per week. She reports that she does not use drugs.    Family History  Family History   Problem Relation Name Age of Onset    No Known Problems Mother      No Known Problems Father          Allergies  Patient has no known allergies.    Review of Systems   Constitutional:  Negative for activity change, appetite change, chills, diaphoresis, fatigue, fever and unexpected weight change.   HENT:  Negative for congestion, dental problem, drooling, ear discharge, ear pain, facial swelling, hearing loss, mouth sores, nosebleeds, postnasal drip, rhinorrhea, sinus pressure, sinus pain, sneezing, sore throat, tinnitus, trouble swallowing and voice change.    Eyes:  Negative for photophobia, pain, discharge, redness, itching and visual disturbance.   Respiratory:  Negative for apnea, cough, choking, chest tightness, shortness of breath, wheezing and stridor.    Cardiovascular:  Positive for chest pain. Negative for palpitations and leg swelling.   Gastrointestinal:  Negative for abdominal distention, abdominal pain, anal bleeding, blood in stool, constipation, diarrhea, nausea, rectal pain and vomiting.   Endocrine: Negative for cold intolerance, heat intolerance, polydipsia, polyphagia and polyuria.   Genitourinary:  Negative for decreased urine volume, difficulty urinating, dysuria, enuresis, flank pain, frequency, genital sores, hematuria and urgency.   Musculoskeletal:  Negative for arthralgias, back pain, gait problem, joint swelling, myalgias, neck pain and neck stiffness.   Skin:  Negative for color change, pallor, rash and wound.   Allergic/Immunologic: Negative for environmental allergies, food allergies and  "immunocompromised state.   Neurological:  Negative for dizziness, tremors, seizures, syncope, facial asymmetry, speech difficulty, weakness, light-headedness, numbness and headaches.   Hematological:  Negative for adenopathy. Does not bruise/bleed easily.   Psychiatric/Behavioral:  Negative for agitation, behavioral problems, confusion, decreased concentration, dysphoric mood, hallucinations, self-injury, sleep disturbance and suicidal ideas. The patient is not nervous/anxious and is not hyperactive.         Physical Exam  Vitals reviewed.   Constitutional:       Appearance: Normal appearance.   HENT:      Head: Normocephalic and atraumatic.      Right Ear: Tympanic membrane, ear canal and external ear normal.      Left Ear: Tympanic membrane, ear canal and external ear normal.      Nose: Nose normal.      Mouth/Throat:      Pharynx: Oropharynx is clear.   Eyes:      Extraocular Movements: Extraocular movements intact.      Conjunctiva/sclera: Conjunctivae normal.      Pupils: Pupils are equal, round, and reactive to light.   Cardiovascular:      Rate and Rhythm: Normal rate and regular rhythm.      Pulses: Normal pulses.      Heart sounds: Normal heart sounds.   Pulmonary:      Effort: Pulmonary effort is normal.      Breath sounds: Normal breath sounds.   Abdominal:      General: Abdomen is flat. Bowel sounds are normal.      Palpations: Abdomen is soft.   Musculoskeletal:      Cervical back: Normal range of motion and neck supple.   Skin:     General: Skin is warm and dry.   Neurological:      General: No focal deficit present.      Mental Status: She is alert and oriented to person, place, and time.   Psychiatric:         Mood and Affect: Mood normal.          Last Recorded Vitals  Blood pressure 117/85, pulse 96, temperature 36.4 °C (97.5 °F), temperature source Temporal, resp. rate 16, height 1.626 m (5' 4\"), weight 89.8 kg (198 lb), SpO2 100 %.    Relevant Results        Scheduled " medications  diphenhydrAMINE, 50 mg, oral, Once  docusate sodium, 100 mg, oral, BID  melatonin, 3 mg, oral, Nightly  pantoprazole, 40 mg, oral, Daily before breakfast   Or  pantoprazole, 40 mg, intravenous, Daily before breakfast  polyethylene glycol, 17 g, oral, Daily      Continuous medications  potassium [id number]-E1-7.45%NaCl, 100 mL/hr, Last Rate: 100 mL/hr (02/21/24 0515)  dextrose 5%-0.45 % sodium chloride, 100 mL/hr      PRN medications  PRN medications: acetaminophen **OR** [DISCONTINUED] acetaminophen **OR** [DISCONTINUED] acetaminophen, benzocaine-menthol, dextromethorphan-guaifenesin, guaiFENesin, ondansetron ODT **OR** ondansetron  Results for orders placed or performed during the hospital encounter of 02/21/24 (from the past 24 hour(s))   CBC   Result Value Ref Range    WBC 6.3 4.4 - 11.3 x10*3/uL    nRBC 0.0 0.0 - 0.0 /100 WBCs    RBC 3.68 (L) 4.00 - 5.20 x10*6/uL    Hemoglobin 10.7 (L) 12.0 - 16.0 g/dL    Hematocrit 33.0 (L) 36.0 - 46.0 %    MCV 90 80 - 100 fL    MCH 29.1 26.0 - 34.0 pg    MCHC 32.4 32.0 - 36.0 g/dL    RDW 13.2 11.5 - 14.5 %    Platelets 221 150 - 450 x10*3/uL   Basic Metabolic Panel   Result Value Ref Range    Glucose 92 65 - 99 mg/dL    Sodium 138 133 - 145 mmol/L    Potassium 3.6 3.4 - 5.1 mmol/L    Chloride 100 97 - 107 mmol/L    Bicarbonate 26 24 - 31 mmol/L    Urea Nitrogen 12 8 - 25 mg/dL    Creatinine 0.60 0.40 - 1.60 mg/dL    eGFR >90 >60 mL/min/1.73m*2    Calcium 10.1 8.5 - 10.4 mg/dL    Anion Gap 12 <=19 mmol/L   Troponin T   Result Value Ref Range    Troponin T, High Sensitivity 500 (HH) <=14 ng/L   Coagulation Screen   Result Value Ref Range    Protime 11.9 9.3 - 12.7 seconds    INR 1.1 0.9 - 1.2    aPTT 56.7 (H) 22.0 - 32.5 seconds   Electrocardiogram, 12-lead PRN ACS symptoms   Result Value Ref Range    Ventricular Rate 76 BPM    Atrial Rate 76 BPM    KS Interval 126 ms    QRS Duration 82 ms    QT Interval 376 ms    QTC Calculation(Bazett) 423 ms    P Axis 68 degrees     R Axis -10 degrees    T Axis 1 degrees    QRS Count 13 beats    Q Onset 218 ms    P Onset 155 ms    P Offset 199 ms    T Offset 406 ms    QTC Fredericia 406 ms   Sedimentation rate, automated   Result Value Ref Range    Sedimentation Rate 44 (H) 0 - 20 mm/h   C-reactive protein   Result Value Ref Range    C-Reactive Protein 0.60 0.00 - 2.00 mg/dL     Cardiac catheterization - coronary    Result Date: 2/21/2024           [place] [address]            Phone [phone] Cardiovascular Catheterization Report Patient Name:      PEDRO CHUA       Performing Physician: [id number] Arnie Mohr MD Study Date:        2/21/2024            Verifying Physician:  Florencia Mohr MD MRN/PID:           [id number]             Cardiologist: Accession#:        [id number]         Ordering Provider:    [id number] ARNIE MOHR Date of Birth/Age: 1980 / 43 years Fellow: Gender:            F                    Fellow: Encounter#:        [id number]  Study: Left Heart Cath  Indications: PEDRO CHUA is a 44 year old female who presents with obesity and a chest pain assessment of typical angina. Elevated troponins. Stress test performed: No. CTA performed: No. Wilfredo accessed: No. LVEF Assessed: No. Cardiac arrest: No responsive following cardiac arrest. Cardiac surgical consult: No cardiac surgery not recommended. Cardiovascular Instability: No  Procedure Description: After infiltration with 1% Lidocaine, the right radial artery was cannulated with a modified Seldinger technique. Subsequently a 6 [place] sheath was placed retrograde in the right radial artery. The arterial sheath was sized up to 6 [place]. Selective coronary catheterization was performed using a 6 Fr catheter(s) exchanged over  a guide wire to cannulate the coronary arteries. After completion of the procedure, the arterial sheath was pulled and a TR Band Radial Compression Device was utilized to obtain patent hemostasis.  Coronary Angiography: The coronary circulation is co-dominant.  Left Main Coronary Artery: The left main coronary artery is a normal caliber vessel. The left main arises normally from the left coronary sinus of Valsalva and bifurcates into the LAD and circumflex coronary arteries. The left main coronary artery showed no significant disease or stenosis greater than 30%.  Left Anterior Descending Coronary Artery Distribution: The left anterior descending coronary artery is a normal caliber vessel. The LAD arises normally from the left main coronary artery. The LAD demonstrated no significant disease or stenosis greater than 30%.  Circumflex Coronary Artery Distribution: The circumflex coronary artery is a normal caliber vessel. The circumflex arises normally from the left main coronary artery and terminates in the AV groove. The circumflex revealed no significant disease or stenosis greater than 30%.  Right Coronary Artery Distribution: The right coronary artery is a normal caliber vessel. The RCA arises normally from the right sinus of Valsalva. The RCA showed no significant disease or stenosis greater than 30%.  Left Ventriculography: The LV ejection fraction was 55 to 60%.  Hemo Personnel: +-------------------------+---------+ Name                     Duty      +-------------------------+---------+ Richard Mohr MD 1 +-------------------------+---------+  Hemodynamic Pressures:  +----+-------------------+---------+------------+-------------+------+---------+ Site     Date Time       Phase    Systolic    Diastolic    ED  Mean mmHg                          Name       mmHg        mmHg      mmHg           +----+-------------------+---------+------------+-------------+------+---------+    AO  2/21/2024 9:23:18  O2 REST         132           86            108                      AM                                                  +----+-------------------+---------+------------+-------------+------+---------+   AO  2/21/2024 9:25:03  O2 REST         132           87            109                      AM                                                  +----+-------------------+---------+------------+-------------+------+---------+   LV  2/21/2024 9:28:10  O2 REST         160            4    12                               AM                                                  +----+-------------------+---------+------------+-------------+------+---------+   LV  2/21/2024 9:28:18  O2 REST         140            3    11                               AM                                                  +----+-------------------+---------+------------+-------------+------+---------+  AOp  2/21/2024 9:30:18  O2 REST         153           86            117                      AM                                                  +----+-------------------+---------+------------+-------------+------+---------+  Complications: No in-lab complications observed.  Cardiac Cath Post Procedure Notes: Post Procedure Diagnosis: Angiographically normal coronary arteries. Blood Loss:               Estimated blood loss during the procedure was 3cc mls. Specimens Removed:        Number of specimen(s) removed: none.  Recommendations: Agressive risk factor modification efforts.  ICD 10 Codes: Elevated Troponin-R79.89  CPT Codes: Left Heart Cath (visualization of coronaries) and LV-44674  [id number] Arnie Mohr MD Performing Physician Electronically signed by [id number] Arnie Mohr MD on 2/21/2024 at 10:20:18 AM  ** Final **     Electrocardiogram, 12-lead PRN ACS symptoms    Result Date: 2/21/2024  Normal sinus rhythm Nonspecific T wave abnormality  Abnormal ECG When compared with ECG of 22-JUN-2020 09:41, Vent. rate has decreased BY  43 BPM    CT angio chest w and wo IV contrast    Result Date: 2/20/2024  * * *Final Report* * * DATE OF EXAM: Feb 20 2024  7:30PM   E[facility]   0540  -  CT CHEST W IVCON PE  / ACCESSION #  [id number] PROCEDURE REASON: Pulmonary embolism (PE) suspected, positive D-dimer      * * * * Physician Interpretation * * * * RESULT: EXAMINATION:  CHEST CT WITH CONTRAST (PULMONARY EMBOLISM PROTOCOL) CLINICAL HISTORY: Chest pain and shortness of breath. Technique:  Spiral CT acquisition of the chest from the thoracic inlet to the upper abdomen following IV contrast.  Axial 1 and 3 mm thick slices plus coronal and sagittal reformatted images. MQ:  CTCP_5 Contrast:  76 mL Omnipaque 350 IV CT Radiation dose: Integrated Dose-length product (DLP) for this visit =   274 mGy*cm CT Dose Reduction Employed: Automated exposure control(AEC) and iterative recon Comparison:  05/25/2013. RESULT: Limitations:  None. Evaluation for thromboembolic disease:      - Right heart chambers:  No thromboembolic disease.      - Main pulmonary arteries:  No thromboembolic disease.      - Lobar pulmonary arteries:  No thromboembolic disease.      - Segmental pulmonary arteries:  No thromboembolic disease.      - Subsegmental pulmonary arteries:  No thromboembolic disease.      - Additional pulmonary artery findings:  Pulmonary arterial prominence is noted Lines, tubes, and devices:  None. Lung parenchyma and airways: No consolidation. No suspicious pulmonary nodule. The central airways are patent. Pleural space:  No pleural effusion.  No pleural thickening. Lower neck, lymph nodes, and mediastinum:  No lymphadenopathy in the supraclavicular, axillary, mediastinal, or hilar regions.  Post surgical changes are noted involving the stomach and GE junction region. Heart, pericardium, and thoracic vessels:  No evidence of aortic aneurysm or dissection. Bones and soft tissues:  No  destructive bone lesion. Chest wall is unremarkable.  Degenerative changes are noted in the thoracic spine. Upper abdomen:  No abnormality in the imaged upper abdomen.  (topogram) images:    IMPRESSION: 1.  No CT evidence of pulmonary embolism. 2.  Pulmonary arterial prominence is noted which can be seen with pulmonary arterial hypertension. Transcribed Using Voice Recognition Transcribe Date/Time: Feb 20 2024  7:41P Dictated by: SUSAN HARRIS MD This examination was interpreted and the report reviewed and electronically signed by: SUSAN HARRIS MD on Feb 20 2024  7:50PM  EST    XR chest 1 view    Result Date: 2/20/2024  * * *Final Report* * * DATE OF EXAM: Feb 20 2024  6:56PM   EUX   5376  -  XR CHEST 1V FRONTAL PORT  / ACCESSION #  [id number] PROCEDURE REASON: Shortness of breath      * * * * Physician Interpretation * * * * RESULT: EXAM:  XR CHEST 1V FRONTAL PORT CLINICAL HISTORY: Shortness of breath COMPARISON: 5/25/2013 RESULT: Lines, tubes, and devices:  None. Lungs and pleura: Bilateral lung fields are clear. No pneumothorax or suggestion for obvious/significant pleural effusions. Cardiomediastinal silhouette:  Normal cardiomediastinal silhouette.    IMPRESSION: No acute cardiopulmonary process. Transcribed Using Voice Recognition Transcribe Date/Time: Feb 20 2024  7:04P Dictated by: JHON WEINSTEIN MD This examination was interpreted and the report reviewed and electronically signed by: JHON WEINSTEIN MD on Feb 20 2024  7:04PM  EST        Assessment/Plan   Active Problems:    NSTEMI (non-ST elevated myocardial infarction) (CMS/HCC)      Cardiology consult  IV heparin and IV nitro started  Cardiac cath per cardiology  No blockages  Will check sed rate and C-reactive protein to rule out myocarditis as troponins are still trending up       I spent  minutes in the professional and overall care of this patient.      Lissy Robledo MD

## 2024-02-21 NOTE — CONSULTS
"Nutrition Assessement Note  Admitted from [facility] for chest pain. Cardiac Cath w/out stenting today. Patient unavailable at time of visit. Screened for MST 2. Hx of gastric bypass surgery in 2021. Epic records indicate 10# weight gain in past 8 months.    Nutrition Assessment    Reason for Assessment: Admission nursing screening (MST 2)    Reason for Hospital Admission:  Floresita Shannon is a 43 y.o. female who is admitted for chest pain.    Nutrition History:  Food and Nutrient History: NA     Food Allergies/Intolerances:  None  GI Symptoms: None  Oral Problems: None    Anthropometrics:  Ht: 162.6 cm (5' 4\"), Wt: 89.8 kg (198 lb), BMI: 33.97     Weight Change:  Daily Weight  02/21/24 : 89.8 kg (198 lb)  06/08/23 : 85.3 kg (188 lb)  06/24/22 : 81.2 kg (179 lb)  03/29/22 : 83.9 kg (185 lb)  02/17/22 : 83 kg (183 lb)  08/19/21 : 104 kg (230 lb)  08/03/21 : 106 kg (234 lb)  07/19/21 : 111 kg (244 lb)  07/02/21 : 116 kg (255 lb)  06/30/21 : 117 kg (257 lb)     Weight History / % Weight Change: Epic records indicate 10# weight gain in past 8 months     Nutrition Focused Physical Exam Findings:      Nutrition Significant Labs:  Lab Results   Component Value Date    WBC 6.3 02/21/2024    HGB 10.7 (L) 02/21/2024    HCT 33.0 (L) 02/21/2024     02/21/2024    CHOL 173 06/22/2022    TRIG 45 06/22/2022    HDL 65 06/22/2022    ALT 9 08/02/2023    AST 12 08/02/2023     02/21/2024    K 3.6 02/21/2024     02/21/2024    CREATININE 0.60 02/21/2024    BUN 12 02/21/2024    CO2 26 02/21/2024    TSH 1.28 06/29/2021    INR 1.1 02/21/2024    HGBA1C 5.1 06/29/2021       Current Facility-Administered Medications:     acetaminophen (Tylenol) tablet 650 mg, 650 mg, oral, q4h PRN, 650 mg at 02/21/24 1211 **OR** [DISCONTINUED] acetaminophen (Tylenol) oral liquid 650 mg, 650 mg, oral, q4h PRN **OR** [DISCONTINUED] acetaminophen (Tylenol) suppository 650 mg, 650 mg, rectal, q4h PRN, Lissy Robledo MD    benzocaine-menthol " (Cepastat Sore Throat) 15-3.6 mg lozenge 1 lozenge, 1 lozenge, Mouth/Throat, q2h PRN, Arnie Mohr MD    dextromethorphan-guaifenesin (Robitussin DM)  mg/5 mL oral liquid 5 mL, 5 mL, oral, q4h PRN, Arnie Mohr MD    dextrose 5 % and sodium chloride 0.45 % with KCl 20 mEq/L infusion, 100 mL/hr, intravenous, Continuous, Arnie Mohr MD, Last Rate: 100 mL/hr at 02/21/24 0515, 100 mL/hr at 02/21/24 0515    dextrose 5%-0.45 % sodium chloride infusion, 100 mL/hr, intravenous, Continuous, Arnie Mohr MD    diphenhydrAMINE (BENADryl) capsule 50 mg, 50 mg, oral, Once, Arnie Mohr MD    docusate sodium (Colace) capsule 100 mg, 100 mg, oral, BID, Arnie Mohr MD    guaiFENesin (Mucinex) 12 hr tablet 600 mg, 600 mg, oral, q12h PRN, Arnie Mohr MD    melatonin tablet 3 mg, 3 mg, oral, Nightly, Arnie Mohr MD, 3 mg at 02/21/24 0133    ondansetron ODT (Zofran-ODT) disintegrating tablet 4 mg, 4 mg, oral, q8h PRN, 4 mg at 02/21/24 1211 **OR** ondansetron (Zofran) injection 4 mg, 4 mg, intravenous, q8h PRN, Arnie Mohr MD, 4 mg at 02/21/24 0704    pantoprazole (ProtoNix) EC tablet 40 mg, 40 mg, oral, Daily before breakfast **OR** pantoprazole (ProtoNix) injection 40 mg, 40 mg, intravenous, Daily before breakfast, Arnie Mohr MD, 40 mg at 02/21/24 0515    polyethylene glycol (Glycolax, Miralax) packet 17 g, 17 g, oral, Daily, Arnie Mohr MD       Estimated Needs:   Estimated Energy Needs  Total Energy Estimated Needs (kCal):  (3202-2296)  Total Estimated Energy Need per Day (kCal/kg):  (25-28)  Method for Estimating Needs: Adj Wt    Estimated Protein Needs  Total Protein Estimated Needs (g):  (51-64)  Total Protein Estimated Needs (g/kg):  (.8-1.0)  Method for Estimating Needs: Adj Wt    Estimated Fluid Needs  Total Fluid Estimated Needs (mL):  (2442-5593)  Method for Estimating Needs: 1 mL/kcal      Nutrition Diagnosis   Nutrition Diagnosis:      Nutrition Diagnosis  Patient has Nutrition Diagnosis: Yes  Diagnosis Status (1): New  Nutrition Diagnosis 1: Inadequate energy intake  Related to (1): decreased ability to consume sufficient energy  As Evidenced by (1): NPO     Nutrition Interventions/Recommendations   Nutrition Interventions and Recommendations:    Nutrition Prescription:  Individualized Nutrition Prescription Provided for : 6810-9854 calories, 51-64 gm protein via oral diet    Nutrition Interventions:   Food and/or Nutrient Delivery Interventions  Interventions: Meals and snacks  Meals and Snacks: General healthful diet  Goal: Advance as able    Education Documentation  No documentation found.         Nutrition Monitoring and Evaluation   Monitoring/Evaluation:   Food/Nutrient Related History Monitoring  Monitoring and Evaluation Plan: Energy intake  Energy Intake: Estimated energy intake  Criteria: Monitoring for diet order       Time Spent/Follow-up:   Follow Up  Time Spent (min): 20 minutes  Last Date of Nutrition Visit: 02/21/24  Nutrition Follow-Up Needed?: 7-10 days  Follow up Comment: 2/28/24

## 2024-02-21 NOTE — PROGRESS NOTES
Occupational Therapy Screen                 Therapy Communication Note    Patient Name: Floresita Shannon  MRN: [id number]  Today's Date: 2/21/2024     Discipline: Occupational Therapy    Comment:  patient is currently  independent with mobility and self care.  She is up ad estefany in her room per nursing report.  No acute OT needs.  Orders will be discontinued

## 2024-02-21 NOTE — NURSING NOTE
"Admitted a 43-year old female patient from [facility], came with chest pain, she is in pain right now 8/10 pain scale, alert and oriented, on room air, kept resting comfortably with call light in reach. Oriented to unit and hill-rom system. Patient stated \"I lost my money $120 in my wallet.\" Nursing supervisor was informed.  "

## 2024-02-21 NOTE — NURSING NOTE
1100: removed 3mL of air from TR band  1110: removed 2mL of air from TR band.    Patient circulation checks WNL.

## 2024-02-21 NOTE — PROGRESS NOTES
Physical Therapy                 Therapy Communication Note    Patient Name: Floresita Shannon  MRN: [id number]  Today's Date: 2/21/2024     Discipline: Physical Therapy    Missed Visit Reason: Missed Visit Reason: Other (Comment) (PT SCREEN)    Missed Time: Attempt    Comment: PT SCREEN                            PT consult received. Chart reviewed. Spoke with nursing.  Pt is independent with no needs for acute skilled PT services. Pt ambulates without an assistive device and has no balance deficits. Pt to have cardiac catherization this morning. Formal evaluation deferred, screen completed.

## 2024-02-21 NOTE — NURSING NOTE
Patient is on heparin drip started 2055 at from [facility] with the rate of 1000 units equivalent to 10mL/hour. PTT is due at 0255 in the morning. Bleeding precaution was explained.

## 2024-02-22 VITALS
BODY MASS INDEX: 33.8 KG/M2 | HEIGHT: 64 IN | DIASTOLIC BLOOD PRESSURE: 84 MMHG | TEMPERATURE: 97.5 F | SYSTOLIC BLOOD PRESSURE: 126 MMHG | WEIGHT: 198 LBS | HEART RATE: 75 BPM | RESPIRATION RATE: 18 BRPM | OXYGEN SATURATION: 100 %

## 2024-02-22 PROBLEM — R07.89 OTHER CHEST PAIN: Status: ACTIVE | Noted: 2024-02-22

## 2024-02-22 LAB — ACT BLD: 163 SEC (ref 89–169)

## 2024-02-22 PROCEDURE — G0378 HOSPITAL OBSERVATION PER HR: HCPCS

## 2024-02-22 PROCEDURE — 99238 HOSP IP/OBS DSCHRG MGMT 30/<: CPT | Performed by: INTERNAL MEDICINE

## 2024-02-22 PROCEDURE — 2500000002 HC RX 250 W HCPCS SELF ADMINISTERED DRUGS (ALT 637 FOR MEDICARE OP, ALT 636 FOR OP/ED): Performed by: INTERNAL MEDICINE

## 2024-02-22 RX ORDER — PANTOPRAZOLE SODIUM 40 MG/1
40 TABLET, DELAYED RELEASE ORAL
Qty: 30 TABLET | Refills: 0 | Status: SHIPPED | OUTPATIENT
Start: 2024-02-23

## 2024-02-22 RX ADMIN — ACETAMINOPHEN 650 MG: 325 TABLET ORAL at 09:05

## 2024-02-22 RX ADMIN — PANTOPRAZOLE SODIUM 40 MG: 40 TABLET, DELAYED RELEASE ORAL at 05:20

## 2024-02-22 SDOH — ECONOMIC STABILITY: FOOD INSECURITY: WITHIN THE PAST 12 MONTHS, YOU WORRIED THAT YOUR FOOD WOULD RUN OUT BEFORE YOU GOT MONEY TO BUY MORE.: NEVER TRUE

## 2024-02-22 SDOH — ECONOMIC STABILITY: FOOD INSECURITY: WITHIN THE PAST 12 MONTHS, THE FOOD YOU BOUGHT JUST DIDN'T LAST AND YOU DIDN'T HAVE MONEY TO GET MORE.: NEVER TRUE

## 2024-02-22 SDOH — ECONOMIC STABILITY: HOUSING INSECURITY: IN THE PAST 12 MONTHS HAS THE ELECTRIC, GAS, OIL, OR WATER COMPANY THREATENED TO SHUT OFF SERVICES IN YOUR HOME?: NO

## 2024-02-22 SDOH — ECONOMIC STABILITY: HOUSING INSECURITY: IN THE LAST 12 MONTHS, HOW MANY PLACES HAVE YOU LIVED?: 1

## 2024-02-22 SDOH — ECONOMIC STABILITY: INCOME INSECURITY: IN THE LAST 12 MONTHS, WAS THERE A TIME WHEN YOU WERE NOT ABLE TO PAY THE MORTGAGE OR RENT ON TIME?: NO

## 2024-02-22 SDOH — ECONOMIC STABILITY: TRANSPORTATION INSECURITY

## 2024-02-22 SDOH — ECONOMIC STABILITY: TRANSPORTATION INSECURITY
IN THE PAST 12 MONTHS, HAS LACK OF TRANSPORTATION KEPT YOU FROM MEETINGS, WORK, OR FROM GETTING THINGS NEEDED FOR DAILY LIVING?: NO

## 2024-02-22 SDOH — ECONOMIC STABILITY: GENERAL

## 2024-02-22 SDOH — ECONOMIC STABILITY: HOUSING INSECURITY: IN THE LAST 12 MONTHS, WAS THERE A TIME WHEN YOU WERE NOT ABLE TO PAY THE MORTGAGE OR RENT ON TIME?: NO

## 2024-02-22 SDOH — ECONOMIC STABILITY: FOOD INSECURITY

## 2024-02-22 SDOH — ECONOMIC STABILITY: HOUSING INSECURITY

## 2024-02-22 SDOH — ECONOMIC STABILITY: HOUSING INSECURITY
IN THE LAST 12 MONTHS, WAS THERE A TIME WHEN YOU DID NOT HAVE A STEADY PLACE TO SLEEP OR SLEPT IN A SHELTER (INCLUDING NOW)?: NO

## 2024-02-22 SDOH — ECONOMIC STABILITY: FOOD INSECURITY: WITHIN THE PAST 12 MONTHS, YOU WORRIED THAT YOUR FOOD WOULD RUN OUT BEFORE YOU GOT THE MONEY TO BUY MORE.: NEVER TRUE

## 2024-02-22 SDOH — ECONOMIC STABILITY: TRANSPORTATION INSECURITY
IN THE PAST 12 MONTHS, HAS THE LACK OF TRANSPORTATION KEPT YOU FROM MEDICAL APPOINTMENTS OR FROM GETTING MEDICATIONS?: NO

## 2024-02-22 SDOH — ECONOMIC STABILITY: FOOD INSECURITY: WITHIN THE PAST 12 MONTHS, THE FOOD YOU BOUGHT JUST DIDN’T LAST AND YOU DIDN’T HAVE MONEY TO GET MORE.: NEVER TRUE

## 2024-02-22 SDOH — ECONOMIC STABILITY: TRANSPORTATION INSECURITY: IN THE PAST 12 MONTHS, HAS LACK OF TRANSPORTATION KEPT YOU FROM MEDICAL APPOINTMENTS OR FROM GETTING MEDICATIONS?: NO

## 2024-02-22 ASSESSMENT — PAIN - FUNCTIONAL ASSESSMENT
PAIN_FUNCTIONAL_ASSESSMENT: 0-10

## 2024-02-22 ASSESSMENT — PROMIS GLOBAL HEALTH SCALE
RATE_QUALITY_OF_LIFE: VERY GOOD
EMOTIONAL_PROBLEMS: SOMETIMES
RATE_PHYSICAL_HEALTH: GOOD
CARRYOUT_PHYSICAL_ACTIVITIES: MODERATELY
RATE_AVERAGE_PAIN: 4
RATE_AVERAGE_FATIGUE: MODERATE
CARRYOUT_SOCIAL_ACTIVITIES: GOOD
RATE_SOCIAL_SATISFACTION: GOOD
RATE_GENERAL_HEALTH: GOOD
RATE_MENTAL_HEALTH: GOOD

## 2024-02-22 ASSESSMENT — PAIN SCALES - GENERAL
PAINLEVEL_OUTOF10: 0 - NO PAIN
PAINLEVEL_OUTOF10: 1
PAINLEVEL_OUTOF10: 4
PAINLEVEL_OUTOF10: 1
PAINLEVEL_OUTOF10: 0 - NO PAIN

## 2024-02-22 ASSESSMENT — COGNITIVE AND FUNCTIONAL STATUS - GENERAL
MOBILITY SCORE: 24
DAILY ACTIVITIY SCORE: 24

## 2024-02-22 ASSESSMENT — SOCIAL DETERMINANTS OF HEALTH (SDOH): IN THE PAST 12 MONTHS, HAS THE ELECTRIC, GAS, OIL, OR WATER COMPANY THREATENED TO SHUT OFF SERVICE IN YOUR HOME?: NO

## 2024-02-22 ASSESSMENT — ACTIVITIES OF DAILY LIVING (ADL): LACK_OF_TRANSPORTATION: NO

## 2024-02-22 ASSESSMENT — PAIN DESCRIPTION - DESCRIPTORS
DESCRIPTORS: ACHING
DESCRIPTORS: ACHING

## 2024-02-22 ASSESSMENT — PAIN DESCRIPTION - LOCATION: LOCATION: BACK

## 2024-02-22 ASSESSMENT — PAIN DESCRIPTION - ORIENTATION: ORIENTATION: UPPER

## 2024-02-22 NOTE — PROGRESS NOTES
Subjective Data:  Patient is feeling much better her symptoms of chest pain completely resolved.  She is in good spirits and like to discharge home    Overnight Events:    None     Objective Data:  Last Recorded Vitals:  Vitals:    02/22/24 0339 02/22/24 0500 02/22/24 0711 02/22/24 1138   BP: 118/70  119/81 120/76   BP Location:   Right arm Right arm   Patient Position:   Lying Lying   Pulse: 77 79 76 72   Resp:   18 16   Temp:   36.3 °C (97.3 °F) 36.4 °C (97.5 °F)   TempSrc:   Temporal Temporal   SpO2:   98% 97%   Weight:       Height:           Last Labs:  CBC - 2/21/2024:  3:16 AM  6.3 10.7 221    33.0      CMP - 2/21/2024:  3:16 AM  10.1 7.0 12 --- 0.4   3.2 4.1 9 54      PTT - 2/21/2024:  5:24 AM  1.1   11.9 56.7     HGBA1C   Date/Time Value Ref Range Status   06/29/2021 02:15 PM 5.1 4.0 - 6.0 % Final     Comment:     Hemoglobin A1C levels are related to mean blood glucose during the   preceding 2-3 months. The relationship table below may be used as a   general guide. Each 1% increase in HGB A1C is a reflection of an   increase in mean glucose of approximately 30 mg/dl.   Reference: Diabetes Care, volume 29, supplement 1 Jan. 2006                        HGB A1C ................. Approx. Mean Glucose   _______________________________________________   6%   ...............................  120 mg/dl   7%   ...............................  150 mg/dl   8%   ...............................  180 mg/dl   9%   ...............................  210 mg/dl   10%  ...............................  240 mg/dl  Performed at [address] [address]     06/01/2020 09:51 AM 5.4 % Final     Comment:          Diagnosis of Diabetes-Adults   Non-Diabetic: < or = 5.6%   Increased risk for developing diabetes: 5.7-6.4%   Diagnostic of diabetes: > or = 6.5%  .       Monitoring of Diabetes                Age (y)     Therapeutic Goal (%)   Adults:          >18           <7.0   Pediatrics:    13-18           <7.5      "              7-12           <8.0                   0- 6            7.5-8.5   American Diabetes Association. Diabetes Care 33(S1), Jan 2010.       LDLCALC   Date/Time Value Ref Range Status   06/22/2022 10:23 AM 99 65 - 130 MG/DL Final   06/29/2021 02:15  65 - 130 MG/DL Final     VLDL   Date/Time Value Ref Range Status   05/20/2020 01:54 PM 17 0 - 40 mg/dL Final   04/11/2018 11:16 AM 10 0 - 40 mg/dL Final      Last I/O:  I/O last 3 completed shifts:  In: 1590.1 (17.7 mL/kg) [P.O.:400; I.V.:115.1 (1.3 mL/kg); IV Piggyback:1075]  Out: 5 (0.1 mL/kg) [Blood:5]  Weight: 89.8 kg     Past Cardiology Tests (Last 3 Years):  EKG:  Electrocardiogram, 12-lead PRN ACS symptoms 02/21/2024    Echo:  No results found for this or any previous visit from the past 1095 days.    Ejection Fractions:  No results found for: \"EF\"  Cath:  Cardiac catheterization - coronary 02/21/2024    Stress Test:  No results found for this or any previous visit from the past 1095 days.    Cardiac Imaging:  No results found for this or any previous visit from the past 1095 days.      Inpatient Medications:  Scheduled medications   Medication Dose Route Frequency    diphenhydrAMINE  50 mg oral Once    docusate sodium  100 mg oral BID    melatonin  3 mg oral Nightly    pantoprazole  40 mg oral Daily before breakfast    Or    pantoprazole  40 mg intravenous Daily before breakfast    polyethylene glycol  17 g oral Daily     PRN medications   Medication    acetaminophen    benzocaine-menthol    dextromethorphan-guaifenesin    guaiFENesin    ondansetron ODT    Or    ondansetron     Continuous Medications   Medication Dose Last Rate    potassium ggmljwp-I9-5.45%NaCl  100 mL/hr Stopped (02/21/24 1600)    dextrose 5%-0.45 % sodium chloride  100 mL/hr         Physical Exam:  EENT PRL neck is supple lungs are clear to auscultation bilaterally with good air entry heart S1-S2 present with no murmurs abdomen soft and nontender EXTR shows no evidence of pedal " edema neuro is grossly nonfocal     Assessment/Plan   #1 elevated sensitivity troponins.  Patient underwent left heart catheterization showing normal coronary arteries that was performed yesterday.  2.  Midepigastric pain for which patient started on probably better send symptoms of chest pain completely resolved.  I suspect the patient may have some underlying anastomosis gastritis as patient underwent gastric bypass  Surgery in the past.  Will continue patient on PPIs for now and I will see her in follow-up in 2 weeks posthospital discharge.  Initial thoughts were that if patient remains symptomatic with chest pain will consider starting her on colchicine.  But for now I see no indication to pursue that route.  I have reassured the patient and discussed with Dr. Lissy Robledo.  Peripheral IV 02/20/24 20 G Left Antecubital (Active)   Site Assessment Clean;Dry;Intact 02/22/24 0900   Dressing Status Clean;Dry 02/22/24 0900   Number of days: 2       Code Status:  Full Code    I spent 25 minutes in the professional and overall care of this patient.        Arnie Mohr MD

## 2024-02-22 NOTE — DISCHARGE SUMMARY
Discharge Diagnosis  Chest pain noncardiac    Issues Requiring Follow-Up  GERD    Test Results Pending At Discharge  Pending Labs       No current pending labs.            Hospital Course   Floresita Shannon is a 43 y.o. female presenting with chest pain.  [facility]  She suddenly started having chest pain did not feel good she left there and went to the drugstore and bought Tums but that did not relieve her chest pain so went to the [facility].  She was found to have elevated troponin which were trending up.  Patient was called non-ST elevation MI and transferred here for further workup.  Patient did not have any EKG changes.  She was started on nitro drip and heparin drip.  She went to the cardiac cath this morning.  She has history of gastric sleeve   Cardiac cath results were.  Her sed rate was not much elevated C-reactive protein was normal.  Her chest pain was gone after the cardiac cath.  Initially concern for myocarditis but since patient's symptoms were gone most likely chest pain related to GI etiology.  Patient discharged in stable condition with plans to follow-up outpatient    Pertinent Physical Exam At Time of Discharge  Physical Exam  Vitals reviewed.   Constitutional:       Appearance: Normal appearance.   HENT:      Head: Normocephalic and atraumatic.      Right Ear: Tympanic membrane, ear canal and external ear normal.      Left Ear: Tympanic membrane, ear canal and external ear normal.      Nose: Nose normal.      Mouth/Throat:      Pharynx: Oropharynx is clear.   Eyes:      Extraocular Movements: Extraocular movements intact.      Conjunctiva/sclera: Conjunctivae normal.      Pupils: Pupils are equal, round, and reactive to light.   Cardiovascular:      Rate and Rhythm: Normal rate and regular rhythm.      Pulses: Normal pulses.      Heart sounds: Normal heart sounds.   Pulmonary:      Effort: Pulmonary effort is normal.      Breath sounds: Normal breath  sounds.   Abdominal:      General: Abdomen is flat. Bowel sounds are normal.      Palpations: Abdomen is soft.   Musculoskeletal:      Cervical back: Normal range of motion and neck supple.   Skin:     General: Skin is warm and dry.   Neurological:      General: No focal deficit present.      Mental Status: She is alert and oriented to person, place, and time.   Psychiatric:         Mood and Affect: Mood normal.         Home Medications     Medication List      START taking these medications     pantoprazole 40 mg EC tablet; Commonly known as: ProtoNix; Take 1 tablet   (40 mg) by mouth once daily in the morning. Take before meals. Do not   crush, chew, or split. Do not start before February 23, 2024.; Start   taking on: February 23, 2024     CONTINUE taking these medications     acyclovir 400 mg tablet; Commonly known as: Zovirax   CALCIUM CITRATE PLUS ORAL   TylenoL 325 mg tablet; Generic drug: acetaminophen     STOP taking these medications     ibuprofen 800 mg tablet       Outpatient Follow-Up  Future Appointments   Date Time Provider Department Center   2/29/2024  8:40 AM Mariah Chavez PA-C [id number] [facility]   3/11/2024  9:15 AM KOSTA SanchezCNM [id number] [facility]   3/14/2024  8:00 AM Félix Abad MD [id number] [facility]   7/11/2024 10:15 AM KOSTA DuqueCNP [id number] [facility]       Lissy Robledo MD

## 2024-02-22 NOTE — CARE PLAN
The patient's goals for the shift include No chest pain    The clinical goals for the shift include heart cath    Over the shift, the patient did not make progress toward the following goals. Barriers to progression include . Recommendations to address these barriers include .

## 2024-02-22 NOTE — PROGRESS NOTES
"Floresita Shannon is a 43 y.o. female on day 1 of admission presenting with No Principal Problem: There is no principal problem currently on the Problem List. Please update the Problem List and refresh..    Subjective   Around 3 AM woke up with back pain no more chest pain       Objective     Physical Exam  Vitals reviewed.   Constitutional:       Appearance: Normal appearance.   HENT:      Head: Normocephalic and atraumatic.      Right Ear: Tympanic membrane, ear canal and external ear normal.      Left Ear: Tympanic membrane, ear canal and external ear normal.      Nose: Nose normal.      Mouth/Throat:      Pharynx: Oropharynx is clear.   Eyes:      Extraocular Movements: Extraocular movements intact.      Conjunctiva/sclera: Conjunctivae normal.      Pupils: Pupils are equal, round, and reactive to light.   Cardiovascular:      Rate and Rhythm: Normal rate and regular rhythm.      Pulses: Normal pulses.      Heart sounds: Normal heart sounds.   Pulmonary:      Effort: Pulmonary effort is normal.      Breath sounds: Normal breath sounds.   Abdominal:      General: Abdomen is flat. Bowel sounds are normal.      Palpations: Abdomen is soft.   Musculoskeletal:      Cervical back: Normal range of motion and neck supple.   Skin:     General: Skin is warm and dry.   Neurological:      General: No focal deficit present.      Mental Status: She is alert and oriented to person, place, and time.   Psychiatric:         Mood and Affect: Mood normal.         Last Recorded Vitals  Blood pressure 126/84, pulse 75, temperature 36.4 °C (97.5 °F), temperature source Temporal, resp. rate 18, height 1.626 m (5' 4\"), weight 89.8 kg (198 lb), SpO2 100 %.  Intake/Output last 3 Shifts:  I/O last 3 completed shifts:  In: 1590.1 (17.7 mL/kg) [P.O.:400; I.V.:115.1 (1.3 mL/kg); IV Piggyback:1075]  Out: 5 (0.1 mL/kg) [Blood:5]  Weight: 89.8 kg     Relevant Results                             Assessment/Plan   Active Problems:    NSTEMI (non-ST " elevated myocardial infarction) (CMS/HCC)    Other chest pain    Cardiac cath normal  Patient is chest pain-free  Back pain most likely from sleeping in the hospital bed  Chest pain probably happened from stomach issues  I do not see need for colchicine will discuss with the cardiologist       I spent  minutes in the professional and overall care of this patient.      Lissy Robledo MD

## 2024-02-22 NOTE — PROGRESS NOTES
Pt might be cleared for dc today.      02/22/24 6526   Discharge Planning   Patient expects to be discharged to: Home

## 2024-02-23 ENCOUNTER — PATIENT OUTREACH (OUTPATIENT)
Dept: CARE COORDINATION | Facility: CLINIC | Age: 44
End: 2024-02-23
Payer: COMMERCIAL

## 2024-02-23 NOTE — PROGRESS NOTES
Discharge facility: [facility]  Discharge diagnosis: Chest pain noncardiac  Admission date: 2/21/24  Discharge date: 2/22/24  PCP Appointment Date: 2/29/24    Outreach call to patient to support a smooth transition of care from recent admission.  Left voicemail message for patient with my contact information [phone].  Enrolled patient in [employer] chatbot for additional support and patient education through transition period.  Will continue to monitor through transition period.     Ruthy Magana RN

## 2024-02-28 ENCOUNTER — PHARMACY VISIT (OUTPATIENT)
Dept: PHARMACY | Facility: CLINIC | Age: 44
End: 2024-02-28
Payer: MEDICAID

## 2024-02-28 PROCEDURE — RXMED WILLOW AMBULATORY MEDICATION CHARGE

## 2024-02-29 ENCOUNTER — OFFICE VISIT (OUTPATIENT)
Dept: PRIMARY CARE | Facility: CLINIC | Age: 44
End: 2024-02-29
Payer: COMMERCIAL

## 2024-02-29 VITALS
DIASTOLIC BLOOD PRESSURE: 86 MMHG | HEART RATE: 70 BPM | BODY MASS INDEX: 34.16 KG/M2 | SYSTOLIC BLOOD PRESSURE: 128 MMHG | OXYGEN SATURATION: 98 % | WEIGHT: 199 LBS

## 2024-02-29 DIAGNOSIS — F41.9 ANXIETY: Primary | ICD-10-CM

## 2024-02-29 DIAGNOSIS — Z09 HOSPITAL DISCHARGE FOLLOW-UP: ICD-10-CM

## 2024-02-29 PROBLEM — B96.89 BV (BACTERIAL VAGINOSIS): Status: RESOLVED | Noted: 2023-08-30 | Resolved: 2024-02-29

## 2024-02-29 PROBLEM — N76.0 BV (BACTERIAL VAGINOSIS): Status: RESOLVED | Noted: 2023-08-30 | Resolved: 2024-02-29

## 2024-02-29 PROBLEM — D64.9 ANEMIA: Status: ACTIVE | Noted: 2024-02-29

## 2024-02-29 PROBLEM — N39.0 LOWER URINARY TRACT INFECTIOUS DISEASE: Status: ACTIVE | Noted: 2024-02-29

## 2024-02-29 PROBLEM — O09.219 PREVIOUS PRETERM DELIVERY, ANTEPARTUM (HHS-HCC): Status: RESOLVED | Noted: 2023-08-30 | Resolved: 2024-02-29

## 2024-02-29 PROBLEM — A59.9 TRICHOMONIASIS: Status: RESOLVED | Noted: 2024-02-29 | Resolved: 2024-02-29

## 2024-02-29 PROBLEM — A59.9 TRICHOMONIASIS: Status: ACTIVE | Noted: 2024-02-29

## 2024-02-29 PROBLEM — N76.0 VAGINITIS: Status: RESOLVED | Noted: 2023-08-30 | Resolved: 2024-02-29

## 2024-02-29 PROBLEM — I21.4 ACUTE NON-ST ELEVATION MYOCARDIAL INFARCTION (NSTEMI) (MULTI): Status: ACTIVE | Noted: 2024-02-20

## 2024-02-29 PROBLEM — L29.2 PRURITUS OF VULVA: Status: RESOLVED | Noted: 2023-08-30 | Resolved: 2024-02-29

## 2024-02-29 PROBLEM — Z98.890 POST-OPERATIVE STATE: Status: RESOLVED | Noted: 2023-08-30 | Resolved: 2024-02-29

## 2024-02-29 PROBLEM — A74.9 CHLAMYDIA: Status: RESOLVED | Noted: 2023-08-30 | Resolved: 2024-02-29

## 2024-02-29 PROBLEM — R51.9 GENERALIZED HEADACHE: Status: ACTIVE | Noted: 2024-02-29

## 2024-02-29 PROBLEM — E87.1 HYPONATREMIA: Status: RESOLVED | Noted: 2024-02-29 | Resolved: 2024-02-29

## 2024-02-29 PROBLEM — B37.89 CANDIDIASIS OF BREAST: Status: RESOLVED | Noted: 2023-08-30 | Resolved: 2024-02-29

## 2024-02-29 PROBLEM — N89.8 VAGINAL DISCHARGE: Status: RESOLVED | Noted: 2024-02-29 | Resolved: 2024-02-29

## 2024-02-29 PROBLEM — N89.8 VAGINAL DISCHARGE: Status: ACTIVE | Noted: 2024-02-29

## 2024-02-29 PROBLEM — N39.0 LOWER URINARY TRACT INFECTIOUS DISEASE: Status: RESOLVED | Noted: 2024-02-29 | Resolved: 2024-02-29

## 2024-02-29 PROBLEM — T81.89XA DELAYED SURGICAL WOUND HEALING: Status: RESOLVED | Noted: 2023-08-30 | Resolved: 2024-02-29

## 2024-02-29 PROBLEM — E87.1 HYPONATREMIA: Status: ACTIVE | Noted: 2024-02-29

## 2024-02-29 PROCEDURE — 99204 OFFICE O/P NEW MOD 45 MIN: CPT | Performed by: PHYSICIAN ASSISTANT

## 2024-02-29 PROCEDURE — 3074F SYST BP LT 130 MM HG: CPT | Performed by: PHYSICIAN ASSISTANT

## 2024-02-29 PROCEDURE — 3079F DIAST BP 80-89 MM HG: CPT | Performed by: PHYSICIAN ASSISTANT

## 2024-02-29 PROCEDURE — 1036F TOBACCO NON-USER: CPT | Performed by: PHYSICIAN ASSISTANT

## 2024-02-29 RX ORDER — IBUPROFEN 200 MG
1 CAPSULE ORAL DAILY
COMMUNITY

## 2024-02-29 RX ORDER — BISMUTH SUBSALICYLATE 262 MG
1 TABLET,CHEWABLE ORAL DAILY
COMMUNITY

## 2024-02-29 RX ORDER — SERTRALINE HYDROCHLORIDE 25 MG/1
25 TABLET, FILM COATED ORAL DAILY
Qty: 30 TABLET | Refills: 2 | Status: SHIPPED | OUTPATIENT
Start: 2024-02-29 | End: 2024-06-10 | Stop reason: SDUPTHER

## 2024-02-29 ASSESSMENT — PATIENT HEALTH QUESTIONNAIRE - PHQ9
1. LITTLE INTEREST OR PLEASURE IN DOING THINGS: NOT AT ALL
SUM OF ALL RESPONSES TO PHQ9 QUESTIONS 1 AND 2: 0
2. FEELING DOWN, DEPRESSED OR HOPELESS: NOT AT ALL

## 2024-02-29 ASSESSMENT — ENCOUNTER SYMPTOMS
LOSS OF SENSATION IN FEET: 0
DEPRESSION: 0
OCCASIONAL FEELINGS OF UNSTEADINESS: 0

## 2024-02-29 NOTE — PROGRESS NOTES
"Sarahi Shannon is a 43 y.o. female who presents as a new patient for a hospital follow up. She was recently seen at [facility] ED 2/20/23 for acute chest pain and shortness of breath. EKG showed no acute changes, however troponins were elevated and uptrending. NSTEMI was suspected and she was then transferred to [facility] (has PCI capability).     She was admitted to [facility] from 2/20/24-2/22/24. During her hospitalization she underwent a L heart cath, which showed normal coronary arteries. Given her history of gastric sleeve 3 yrs ago, it was suspected that she may have anastomosis gastritis. \"Will continue patient on PPIs (pantoprazole) for now and I will see her in follow-up in 2 weeks posthospital discharge. Initial thoughts were that if patient remains symptomatic with chest pain will consider starting her on colchicine. But for now I see no indication to pursue that route.\"     Since discharge she has been feeling overall well. She continues to take the pantoprazole and states she has not had any recurrences of chest pain. She is scheduled for a cardiology post-hospital follow up on 3/15/24.     Unrelated, she is c/o difficulty sleeping and increased anxiety since January. She states on 1/9/24 her son who has cerebral palsy was being brought off of his school bus in the wheelchair. The aide tripped and fell backwards, pulling his wheelchair upside down and to the ground. Since then she has had difficulty with watching him be loaded/brought off of the bus daily. She has tried melatonin for sleep without any relief.     12 point ROS reviewed and negative other than as stated in HPI    /86   Pulse 70   Wt 90.3 kg (199 lb)   SpO2 98%   BMI 34.16 kg/m²   Objective   Physical Exam  Vitals reviewed.   Constitutional:       General: She is not in acute distress.     Appearance: Normal appearance. She is not ill-appearing.   HENT:      Head: Normocephalic and atraumatic.   Eyes:      " General: No scleral icterus.     Extraocular Movements: Extraocular movements intact.      Conjunctiva/sclera: Conjunctivae normal.      Pupils: Pupils are equal, round, and reactive to light.   Cardiovascular:      Rate and Rhythm: Normal rate and regular rhythm.      Heart sounds: Normal heart sounds. No murmur heard.     No friction rub. No gallop.   Pulmonary:      Effort: Pulmonary effort is normal. No respiratory distress.      Breath sounds: Normal breath sounds. No stridor. No wheezing, rhonchi or rales.   Musculoskeletal:      Cervical back: Normal range of motion.      Right lower leg: No edema.      Left lower leg: No edema.   Skin:     General: Skin is warm and dry.   Neurological:      Mental Status: She is alert and oriented to person, place, and time. Mental status is at baseline.      Cranial Nerves: No cranial nerve deficit.      Gait: Gait normal.   Psychiatric:         Mood and Affect: Mood normal.         Behavior: Behavior normal.         Assessment/Plan   Problem List Items Addressed This Visit       Anxiety - Primary     Acute stress disorder vs anxiety. With anxiety and insomnia. Begin sertraline 25mg once daily, discussed possible side effects. May feel some effect in 1-2 weeks and the full effect in 4-6 weeks. Patient deferred PEE, DEON Steve at this time.          Relevant Medications    sertraline (Zoloft) 25 mg tablet    Hospital discharge follow-up     Symptoms resolved. Continue on pantoprazole 40mg daily. Avoid triggering foods of GERD.            Follow up in March with Dr. Abad or sooner as needed

## 2024-02-29 NOTE — ASSESSMENT & PLAN NOTE
Acute stress disorder vs anxiety. With anxiety and insomnia. Begin sertraline 25mg once daily, discussed possible side effects. May feel some effect in 1-2 weeks and the full effect in 4-6 weeks. Patient deferred Ann GLASGOW LISW at this time.

## 2024-03-11 ENCOUNTER — APPOINTMENT (OUTPATIENT)
Dept: OBSTETRICS AND GYNECOLOGY | Facility: CLINIC | Age: 44
End: 2024-03-11
Payer: COMMERCIAL

## 2024-03-14 ENCOUNTER — OFFICE VISIT (OUTPATIENT)
Dept: PRIMARY CARE | Facility: CLINIC | Age: 44
End: 2024-03-14
Payer: COMMERCIAL

## 2024-03-14 ENCOUNTER — LAB (OUTPATIENT)
Dept: LAB | Facility: LAB | Age: 44
End: 2024-03-14
Payer: COMMERCIAL

## 2024-03-14 VITALS
BODY MASS INDEX: 32.98 KG/M2 | DIASTOLIC BLOOD PRESSURE: 72 MMHG | HEIGHT: 64 IN | RESPIRATION RATE: 18 BRPM | HEART RATE: 78 BPM | OXYGEN SATURATION: 100 % | SYSTOLIC BLOOD PRESSURE: 128 MMHG | WEIGHT: 193.2 LBS

## 2024-03-14 DIAGNOSIS — Z12.31 ENCOUNTER FOR SCREENING MAMMOGRAM FOR MALIGNANT NEOPLASM OF BREAST: ICD-10-CM

## 2024-03-14 DIAGNOSIS — Z00.00 HEALTHCARE MAINTENANCE: Primary | ICD-10-CM

## 2024-03-14 DIAGNOSIS — Z00.00 HEALTHCARE MAINTENANCE: ICD-10-CM

## 2024-03-14 DIAGNOSIS — F41.9 ANXIETY: ICD-10-CM

## 2024-03-14 PROBLEM — E21.3 HYPERPARATHYROIDISM (MULTI): Status: RESOLVED | Noted: 2023-08-30 | Resolved: 2024-03-14

## 2024-03-14 LAB
BASOPHILS # BLD AUTO: 0.01 X10*3/UL (ref 0–0.1)
BASOPHILS NFR BLD AUTO: 0.2 %
CHOLEST SERPL-MCNC: 193 MG/DL (ref 0–199)
CHOLESTEROL/HDL RATIO: 2.7
EOSINOPHIL # BLD AUTO: 0.03 X10*3/UL (ref 0–0.7)
EOSINOPHIL NFR BLD AUTO: 0.6 %
ERYTHROCYTE [DISTWIDTH] IN BLOOD BY AUTOMATED COUNT: 13.2 % (ref 11.5–14.5)
EST. AVERAGE GLUCOSE BLD GHB EST-MCNC: 108 MG/DL
HBA1C MFR BLD: 5.4 %
HCT VFR BLD AUTO: 36.6 % (ref 36–46)
HDLC SERPL-MCNC: 72.7 MG/DL
HGB BLD-MCNC: 11.5 G/DL (ref 12–16)
IMM GRANULOCYTES # BLD AUTO: 0.01 X10*3/UL (ref 0–0.7)
IMM GRANULOCYTES NFR BLD AUTO: 0.2 % (ref 0–0.9)
LDLC SERPL DIRECT ASSAY-MCNC: 109 MG/DL (ref 0–129)
LYMPHOCYTES # BLD AUTO: 1.89 X10*3/UL (ref 1.2–4.8)
LYMPHOCYTES NFR BLD AUTO: 35.5 %
MCH RBC QN AUTO: 28.3 PG (ref 26–34)
MCHC RBC AUTO-ENTMCNC: 31.4 G/DL (ref 32–36)
MCV RBC AUTO: 90 FL (ref 80–100)
MONOCYTES # BLD AUTO: 0.39 X10*3/UL (ref 0.1–1)
MONOCYTES NFR BLD AUTO: 7.3 %
NEUTROPHILS # BLD AUTO: 2.99 X10*3/UL (ref 1.2–7.7)
NEUTROPHILS NFR BLD AUTO: 56.2 %
NON-HDL CHOLESTEROL: 120 MG/DL (ref 0–149)
NRBC BLD-RTO: 0 /100 WBCS (ref 0–0)
PLATELET # BLD AUTO: 242 X10*3/UL (ref 150–450)
PTH-INTACT SERPL-MCNC: 44.5 PG/ML (ref 18.5–88)
RBC # BLD AUTO: 4.06 X10*6/UL (ref 4–5.2)
TSH SERPL-ACNC: 1.05 MIU/L (ref 0.44–3.98)
WBC # BLD AUTO: 5.3 X10*3/UL (ref 4.4–11.3)

## 2024-03-14 PROCEDURE — 83718 ASSAY OF LIPOPROTEIN: CPT

## 2024-03-14 PROCEDURE — 3078F DIAST BP <80 MM HG: CPT | Performed by: INTERNAL MEDICINE

## 2024-03-14 PROCEDURE — 82465 ASSAY BLD/SERUM CHOLESTEROL: CPT

## 2024-03-14 PROCEDURE — 84443 ASSAY THYROID STIM HORMONE: CPT

## 2024-03-14 PROCEDURE — 85025 COMPLETE CBC W/AUTO DIFF WBC: CPT

## 2024-03-14 PROCEDURE — 83970 ASSAY OF PARATHORMONE: CPT

## 2024-03-14 PROCEDURE — 36415 COLL VENOUS BLD VENIPUNCTURE: CPT

## 2024-03-14 PROCEDURE — 1036F TOBACCO NON-USER: CPT | Performed by: INTERNAL MEDICINE

## 2024-03-14 PROCEDURE — 3074F SYST BP LT 130 MM HG: CPT | Performed by: INTERNAL MEDICINE

## 2024-03-14 PROCEDURE — 83721 ASSAY OF BLOOD LIPOPROTEIN: CPT

## 2024-03-14 PROCEDURE — 83036 HEMOGLOBIN GLYCOSYLATED A1C: CPT

## 2024-03-14 PROCEDURE — 99396 PREV VISIT EST AGE 40-64: CPT | Performed by: INTERNAL MEDICINE

## 2024-03-14 PROCEDURE — 99214 OFFICE O/P EST MOD 30 MIN: CPT | Performed by: INTERNAL MEDICINE

## 2024-03-14 ASSESSMENT — ENCOUNTER SYMPTOMS
SINUS PRESSURE: 0
COUGH: 0
DIZZINESS: 0
HEADACHES: 0
BLOOD IN STOOL: 0
HEMATURIA: 0
DYSURIA: 0
JOINT SWELLING: 0
CONSTIPATION: 0
NECK PAIN: 0
DIARRHEA: 0
SHORTNESS OF BREATH: 0
ARTHRALGIAS: 0
NECK STIFFNESS: 0
NAUSEA: 0
VOMITING: 0
WEAKNESS: 0
DEPRESSION: 0
BACK PAIN: 0
WHEEZING: 0
FEVER: 0
MYALGIAS: 0
LOSS OF SENSATION IN FEET: 0
DIAPHORESIS: 0
RHINORRHEA: 0
FATIGUE: 0
PALPITATIONS: 0
APPETITE CHANGE: 0
ABDOMINAL DISTENTION: 0
ABDOMINAL PAIN: 0
NUMBNESS: 0
FREQUENCY: 0
CHILLS: 0
DIFFICULTY URINATING: 0
OCCASIONAL FEELINGS OF UNSTEADINESS: 0
SORE THROAT: 0
LIGHT-HEADEDNESS: 0

## 2024-03-14 ASSESSMENT — PATIENT HEALTH QUESTIONNAIRE - PHQ9
SUM OF ALL RESPONSES TO PHQ9 QUESTIONS 1 AND 2: 0
1. LITTLE INTEREST OR PLEASURE IN DOING THINGS: NOT AT ALL
2. FEELING DOWN, DEPRESSED OR HOPELESS: NOT AT ALL

## 2024-03-14 NOTE — PROGRESS NOTES
"Subjective   Patient ID: Floresita Shannon is a 43 y.o. female who presents for Annual Exam (Establish care/Mammogram order/).    HPI   She presents for follow-up for anxiety. She feels a lot better since starting sertraline.     Review of Systems   Constitutional:  Negative for appetite change, chills, diaphoresis, fatigue and fever.   HENT:  Negative for congestion, ear discharge, ear pain, hearing loss, postnasal drip, rhinorrhea, sinus pressure, sore throat and tinnitus.    Eyes:  Negative for visual disturbance.   Respiratory:  Negative for cough, shortness of breath and wheezing.    Cardiovascular:  Negative for chest pain, palpitations and leg swelling.   Gastrointestinal:  Negative for abdominal distention, abdominal pain, blood in stool, constipation, diarrhea, nausea and vomiting.   Genitourinary:  Negative for decreased urine volume, difficulty urinating, dysuria, frequency, hematuria and urgency.   Musculoskeletal:  Negative for arthralgias, back pain, gait problem, joint swelling, myalgias, neck pain and neck stiffness.   Skin:  Negative for rash.   Neurological:  Negative for dizziness, weakness, light-headedness, numbness and headaches.         Objective   /72   Pulse 78   Resp 18   Ht 1.626 m (5' 4\")   Wt 87.6 kg (193 lb 3.2 oz)   SpO2 100%   BMI 33.16 kg/m²     Physical Exam  Vitals reviewed.   Constitutional:       Appearance: Normal appearance. She is normal weight.   HENT:      Right Ear: Tympanic membrane and external ear normal.      Left Ear: Tympanic membrane and external ear normal.   Eyes:      Extraocular Movements: Extraocular movements intact.      Conjunctiva/sclera: Conjunctivae normal.      Pupils: Pupils are equal, round, and reactive to light.   Cardiovascular:      Rate and Rhythm: Normal rate and regular rhythm.      Pulses: Normal pulses.   Pulmonary:      Effort: Pulmonary effort is normal.      Breath sounds: Normal breath sounds.   Abdominal:      General: Bowel " sounds are normal.      Palpations: Abdomen is soft.   Musculoskeletal:         General: Normal range of motion.      Cervical back: Normal range of motion.   Skin:     General: Skin is warm and dry.   Neurological:      General: No focal deficit present.      Mental Status: She is oriented to person, place, and time.   Psychiatric:         Mood and Affect: Mood normal.         Behavior: Behavior normal.           Assessment/Plan   Problem List Items Addressed This Visit             ICD-10-CM    Anxiety F41.9     Secondly improved since starting sertraline  Continue sertraline 25 mg daily  Patient wishes to defer psychotherapy currently         Healthcare maintenance - Primary Z00.00     Mammogram ordered  Pap smear up-to-date         Relevant Orders    CBC and Auto Differential    Cholesterol, LDL Direct    Hemoglobin A1C    Lipid Panel Non-Fasting    TSH with reflex to Free T4 if abnormal    Parathyroid Hormone, Intact    Encounter for screening mammogram for malignant neoplasm of breast Z12.31    Relevant Orders    BI mammo bilateral screening tomosynthesis   RTC in 4-6 mo

## 2024-03-14 NOTE — ASSESSMENT & PLAN NOTE
Secondly improved since starting sertraline  Continue sertraline 25 mg daily  Patient wishes to defer psychotherapy currently

## 2024-04-23 ENCOUNTER — APPOINTMENT (OUTPATIENT)
Dept: RADIOLOGY | Facility: CLINIC | Age: 44
End: 2024-04-23
Payer: COMMERCIAL

## 2024-04-30 ENCOUNTER — OFFICE VISIT (OUTPATIENT)
Dept: OBSTETRICS AND GYNECOLOGY | Facility: CLINIC | Age: 44
End: 2024-04-30
Payer: COMMERCIAL

## 2024-04-30 VITALS
DIASTOLIC BLOOD PRESSURE: 78 MMHG | BODY MASS INDEX: 34.15 KG/M2 | WEIGHT: 200 LBS | SYSTOLIC BLOOD PRESSURE: 120 MMHG | HEIGHT: 64 IN

## 2024-04-30 DIAGNOSIS — R87.611 PAP SMEAR OF CERVIX WITH ASCUS, CANNOT EXCLUDE HGSIL: ICD-10-CM

## 2024-04-30 DIAGNOSIS — N95.1 PERIMENOPAUSE: ICD-10-CM

## 2024-04-30 DIAGNOSIS — Z01.419 WELL WOMAN EXAM: Primary | ICD-10-CM

## 2024-04-30 DIAGNOSIS — B00.9 HERPES: ICD-10-CM

## 2024-04-30 PROBLEM — R07.89 OTHER CHEST PAIN: Status: RESOLVED | Noted: 2024-02-22 | Resolved: 2024-04-30

## 2024-04-30 PROBLEM — N72 CERVICITIS: Status: RESOLVED | Noted: 2023-08-30 | Resolved: 2024-04-30

## 2024-04-30 PROBLEM — I21.4 ACUTE NON-ST ELEVATION MYOCARDIAL INFARCTION (NSTEMI) (MULTI): Status: RESOLVED | Noted: 2024-02-20 | Resolved: 2024-04-30

## 2024-04-30 PROBLEM — O09.529 AMA (ADVANCED MATERNAL AGE) MULTIGRAVIDA 35+ (HHS-HCC): Status: RESOLVED | Noted: 2023-08-30 | Resolved: 2024-04-30

## 2024-04-30 PROBLEM — I21.4 NSTEMI (NON-ST ELEVATED MYOCARDIAL INFARCTION) (MULTI): Status: RESOLVED | Noted: 2024-02-21 | Resolved: 2024-04-30

## 2024-04-30 PROCEDURE — 3074F SYST BP LT 130 MM HG: CPT | Performed by: ADVANCED PRACTICE MIDWIFE

## 2024-04-30 PROCEDURE — 1036F TOBACCO NON-USER: CPT | Performed by: ADVANCED PRACTICE MIDWIFE

## 2024-04-30 PROCEDURE — 99396 PREV VISIT EST AGE 40-64: CPT | Performed by: ADVANCED PRACTICE MIDWIFE

## 2024-04-30 PROCEDURE — 3078F DIAST BP <80 MM HG: CPT | Performed by: ADVANCED PRACTICE MIDWIFE

## 2024-04-30 RX ORDER — VALACYCLOVIR HYDROCHLORIDE 500 MG/1
500 TABLET, FILM COATED ORAL DAILY
Qty: 30 TABLET | Refills: 11 | Status: SHIPPED | OUTPATIENT
Start: 2024-04-30 | End: 2025-04-30

## 2024-04-30 ASSESSMENT — PAIN SCALES - GENERAL: PAINLEVEL: 0-NO PAIN

## 2024-04-30 NOTE — ASSESSMENT & PLAN NOTE
Not a confirmed MI -- patient was diagnosed with GI issue   Olumiant Pregnancy And Lactation Text: Based on animal studies, Olumiant may cause embryo-fetal harm when administered to pregnant women.  The medication should not be used in pregnancy.  Breastfeeding is not recommended during treatment.

## 2024-04-30 NOTE — ASSESSMENT & PLAN NOTE
Paps due every 3 years  Next pap due 2025  Mammogram 2024  Hx HSIL with LEEP  Breast cancer paternal grandmother -- diagnosed after 50  Colon cancer - paternal grandfather -- unsure of diagnosis  Plan colonoscopy at age 45

## 2024-04-30 NOTE — PROGRESS NOTES
"Assessment/Plan   Problem List Items Addressed This Visit       Pap smear of cervix with ASCUS, cannot exclude HGSIL    Well woman exam - Primary    Overview     Pap du         Current Assessment & Plan     Paps due every 3 years  Next pap due 2025  Mammogram 2024  Hx HSIL with LEEP  Breast cancer paternal grandmother -- diagnosed after 50  Colon cancer - paternal grandfather -- unsure of diagnosis  Plan colonoscopy at age 45          Other Visit Diagnoses       Herpes        Relevant Medications    valACYclovir (Valtrex) 500 mg tablet    Perimenopause        Relevant Orders    Referral to Clinical Pharmacy             Clary Snow, MEGGAN-FELIPE Mcclain   Floresita Shannon is a 43 y.o. female who is here for a routine exam and experiencing perimenopausal symptoms including irregular periods (Jan 2024 last one), hot flashes, decreased libido, sleep disturbances    Current contraception: none  History of abnormal Pap smear: yes - HSIL  History of LEEP or cryo:  yes - unknown year  Family history of uterine or ovarian cancer: no  Family history of breast cancer: yes - paternal grandmother  Regular self breast exam: yes  History of abnormal mammogram: no    She has issues with lubrication: no  She reports issue with orgasms: no - decreased since perimeno.  Scream cream to [facility] pharmacy called in.  She reports pain with sexual activity: no  She reports sexual activity with: (male 1)    No history of:  MI (although problem list has Non STEMI MI - the patient's discharge diagnosis reports she had GI concerns)  Stroke  Uncontrolled high blood pressure  No inflammatory bowel    ROS    Objective   /78   Ht 1.626 m (5' 4\")   Wt 90.7 kg (200 lb)   LMP 01/01/2024 (Approximate)   BMI 34.33 kg/m²     General:   Alert and oriented x 3   Heart:  Thyroid: Regular rate, rhythm  Euthyroid, normal shape and size   Lungs:  Breast: Clear to auscultation bilaterally  Symmetrical, no skin changes/nipple discharge, " redness, tenderness, no masses palpated bilaterally   Abdomen: Soft, non tender   Vulva: EGBUS normal   Vagina: Pink, normal discharge   Cervix: No CMT   Uterus: Normal shape, size   Adnexa: NT bilaterally       Thank you for coming to see me for your visit today and most importantly thank you for having a preventative visit.  If lab work was sent, you will see your test result via [employer]  Any prescriptions will be sent electronically to your pharmacy listed    I look forward to seeing you next year for your health care needs.  Please remember:   Sleep is important!   Exercise such as walking for 20 minutes per day is beneficial to your physical and mental health   Sleep is so important and should be a priority!   Healthy diets can be expensive -- if you every feel like you are struggling to afford healthy food, please let me know as we have a very good program called [employer] that is available to you   Decrease alcohol and tobacco    Be well!  Lexi    Thank you for coming into see me today.    Your prescription has been sent to the Clinical Pharmacist Kristen Diaz  She will work with your insurance company to get the medication through the prior auth process  Kristen will contact you to set up a telehealth shortly to make sure she has all the information needed for your insurance company.

## 2024-05-14 NOTE — PROGRESS NOTES
Patient ID: Floresita Shannon is a 43 y.o. female who presents for No chief complaint on file..    Referring Provider: Clary Snow    Pt was referred for Estradiol patch 0.0375mg + Progesterone 100mg daily    Last visit with referring provider: 4/30/24    Preferred Pharmacy:    [facility]/pharmacy #[id number] - [place], [place] - [address] [address]  [address]  [facility] [place] [address]  Phone: [phone] Fax: [phone]      Copay assistance:   Do you have copays on your medications? no  Do you have trouble affording your current medications? N/a     Patient Assistance Screening (VAF)    Patient verbally reports monthly or yearly income which is less than 400% federal poverty level   Application for program has been submitted for the following medications:   Discussed, she will let me know if needed   Patient has been informed that program team will be reaching out to them to discuss necessary documentation, instructed to answer phone/return voicemail.   Patient aware this process may take up to 6 weeks.   If approved [facility] and may be picked up or mailed to patient.       Subjective      LMP: 12/01/23  Uterus: Yes      Any Contraindications and/or Cautions to HT:   PH/FH breast cancer: No  Estrogen sensitive?  PH/FH of ovarian cancer: No  PH/FH of uterine cancer: No  PH/FH of colon cancer: No  PH/FH of pancreatic cancer: No  PH/FH Dementia: No  Stroke, MI, VTE, clotting disorder, or congenital heart disease: No  Systemic lupus erythematosus (increased clot risk): No  H/o Gallbladder disease? No  Tobacco? No  Alcohol? No      Nutrition:   Goal: 21+ grams of fiber daily  Focus on whole foods, colorful, diversity  Omega-3's - low mercury, S.M.A.S.H. fish, 2 servings/week or supplement  Limit saturated fats, refined carbs/simple sugars, caffeine/alcohol    Movement:   Joint pain? No  Strength training? No    Sleep:   Daytime brain fog/memory troubles? A little    Stress:   2 or 3  "out of 10 (since starting anxiety medication)    GI issues? No  Frequency of BM? Daily      Vaginal Symptoms  Dyspareunia (pain before/during/after intercourse): No  Vaginal Bleeding: No  Vaginal Dryness: No  Dysuria (pain, burning, stinging, or itching with urination): No  Vaginal and/or vulvar irritation/itching: No  Recurrent urinary tract infections: No  Recurrent yeast infections: No  No results found for: \"ESTROGEN\", \"PROGESTERONE\"     Previous Treatment:   None    Vasomotor symptoms   Frequency:  Feels like she is burning up, wishes she had a freezer to walk into-4-5x/day. Occurring at night too, 1x/night. Soaking through clothes.     Previous Treatment:   None  Recommended Treatment:   Estradiol 0.0375 patch + protesterone  Lab Results   Component Value Date    AST 12 08/02/2023    AST 14 06/02/2023    AST 13 06/22/2022    ALT 9 08/02/2023    ALT 10 06/02/2023    ALT 14 06/22/2022       Libido  \"I don't have any\"     Urinary Incontinence  Needing to use the bathroom more often.  Frequency:  Yes  Urgency:  Yes  Incontinence: No      Lab Results   Component Value Date    CREATININE 0.60 02/21/2024       Bone Health  Osteopenia or osteoporosis: No  Fracture history: No     Cardiovascular Health  The ASCVD Risk score (Clarita MERCHANT, et al., 2019) failed to calculate for the following reasons:    The patient has a prior MI or stroke diagnosis    Lab Results   Component Value Date    CHOL 193 03/14/2024     Lab Results   Component Value Date    HDL 72.7 03/14/2024     Lab Results   Component Value Date    LDLCALC 99 06/22/2022     Lab Results   Component Value Date    TRIG 45 06/22/2022     No components found for: \"CHOLHDL\"   BP Readings from Last 3 Encounters:   04/30/24 120/78   03/14/24 128/72   02/29/24 128/86        Blood Sugar Balance  Lab Results   Component Value Date    GLUCOSE 92 02/21/2024    HGBA1C 5.4 03/14/2024    HGBA1C 5.1 06/29/2021    HGBA1C 5.4 06/01/2020       Thyroid  Lab Results   Component " Value Date    TSH 1.05 03/14/2024        Iron Status  Lab Results   Component Value Date    IRON 126 08/02/2023    TIBC 285 08/02/2023    FERRITIN 34 08/02/2023        Potassium  Lab Results   Component Value Date    K 3.6 02/21/2024        Vitamin D3  Lab Results   Component Value Date    VITD25 39 08/02/2023       Current Outpatient Medications on File Prior to Visit   Medication Sig Dispense Refill    calcium citrate (Calcitrate) 200 mg (950 mg) tablet Take 1 tablet (200 mg) by mouth once daily.      multivitamin tablet Take 1 tablet by mouth once daily.      pantoprazole (ProtoNix) 40 mg EC tablet Take 1 tablet (40 mg) by mouth once daily in the morning. Take before meals. Do not crush, chew, or split. Do not start before February 23, 2024. 30 tablet 0    sertraline (Zoloft) 25 mg tablet Take 1 tablet (25 mg) by mouth once daily. 30 tablet 2    valACYclovir (Valtrex) 500 mg tablet Take 1 tablet (500 mg) by mouth once daily. 30 tablet 11     No current facility-administered medications on file prior to visit.        Medication and allergy reconciliation completed     Drug Interactions   No significant drug interactions identified    Assessment/Plan     Patient is experiencing GSM including vasomotor symptoms and loss of libido.   Patient has tried/failed: none  Patient is <60 years of age or within 10 years of menopause without contraindications to hormone therapy (eg, breast cancer).   Plan is to start transdermal estradiol and to increase if needed approximately every 4 weeks as needed to relieve symptoms.     Estradiol and progesterone  Provided patient education on the MOA, side effects, cautions/warnings, as well as application of estradiol patch and progesterone.    Patch Application: Fully clean and dry your skin (may use an alcohol wipe), first, and select an area of unbroken skin. Avoid the breast area; typically, good places to choose include the lower stomach or upper buttocks. Once affixed, the patch  "will release estrogen into your body.   Patches do not \"re-stick\" once they are applied they cannot be moved to another location.   Do not apply direct heat to patch (such as a heating pad). Do not cut patches. Do not submerge in water for long periods of time.   If going swimming or having trouble with patch sticking may consider applying a Tegaderm patch over to protect the patch.   When removing the patch, fold in half, and discard in the trash can away from children or pets (DO NOT FLUSH).     START  Vivelle Dot patch 0.037mg apply/remove twice weekly (every 3-4 days)  Progesterone 100 daily at bedtime for 14 days on then 14 days off      Follow-up: 6/19/24 2pm     Time spent with pt: Total length of time 30 (minutes) of the encounter and more than 50% was spent counseling the patient.      Kristen Diaz, Pharm.D, [facility], [facility]  Clinical Pharmacist  Pharmacy Services  [phone]    Continue all meds under the continuation of care with the referring provider and clinical pharmacy team.    Verbal consent to manage patient's drug therapy was obtained from the patient and/or an individual authorized to act on behalf of a patient. They were informed they may decline to participate or withdraw from participation in pharmacy services at any time.  "

## 2024-05-15 ENCOUNTER — TELEMEDICINE (OUTPATIENT)
Dept: PHARMACY | Facility: HOSPITAL | Age: 44
End: 2024-05-15
Payer: COMMERCIAL

## 2024-05-15 DIAGNOSIS — N95.1 PERIMENOPAUSE: ICD-10-CM

## 2024-05-15 PROCEDURE — RXMED WILLOW AMBULATORY MEDICATION CHARGE

## 2024-05-15 RX ORDER — PROGESTERONE 100 MG/1
100 CAPSULE ORAL DAILY
Qty: 40 CAPSULE | Refills: 3 | Status: SHIPPED | OUTPATIENT
Start: 2024-05-15 | End: 2025-05-15

## 2024-05-15 RX ORDER — ESTRADIOL 0.04 MG/D
1 FILM, EXTENDED RELEASE TRANSDERMAL 2 TIMES WEEKLY
Qty: 24 PATCH | Refills: 3 | Status: SHIPPED | OUTPATIENT
Start: 2024-05-16 | End: 2025-05-16

## 2024-05-17 ENCOUNTER — PHARMACY VISIT (OUTPATIENT)
Dept: PHARMACY | Facility: CLINIC | Age: 44
End: 2024-05-17
Payer: MEDICAID

## 2024-05-21 ENCOUNTER — HOSPITAL ENCOUNTER (OUTPATIENT)
Dept: RADIOLOGY | Facility: CLINIC | Age: 44
Discharge: HOME | End: 2024-05-21
Payer: COMMERCIAL

## 2024-05-21 VITALS — WEIGHT: 200 LBS | HEIGHT: 64 IN | BODY MASS INDEX: 34.15 KG/M2

## 2024-05-21 DIAGNOSIS — Z12.31 ENCOUNTER FOR SCREENING MAMMOGRAM FOR MALIGNANT NEOPLASM OF BREAST: ICD-10-CM

## 2024-05-21 PROCEDURE — 77067 SCR MAMMO BI INCL CAD: CPT

## 2024-05-21 PROCEDURE — 77063 BREAST TOMOSYNTHESIS BI: CPT | Performed by: RADIOLOGY

## 2024-05-21 PROCEDURE — 77067 SCR MAMMO BI INCL CAD: CPT | Performed by: RADIOLOGY

## 2024-05-24 ENCOUNTER — PHARMACY VISIT (OUTPATIENT)
Dept: PHARMACY | Facility: CLINIC | Age: 44
End: 2024-05-24
Payer: MEDICAID

## 2024-05-24 PROCEDURE — RXMED WILLOW AMBULATORY MEDICATION CHARGE

## 2024-06-07 PROCEDURE — RXMED WILLOW AMBULATORY MEDICATION CHARGE

## 2024-06-10 DIAGNOSIS — F41.9 ANXIETY: ICD-10-CM

## 2024-06-11 ENCOUNTER — PHARMACY VISIT (OUTPATIENT)
Dept: PHARMACY | Facility: CLINIC | Age: 44
End: 2024-06-11
Payer: MEDICAID

## 2024-06-11 RX ORDER — SERTRALINE HYDROCHLORIDE 25 MG/1
25 TABLET, FILM COATED ORAL DAILY
Qty: 30 TABLET | Refills: 2 | Status: SHIPPED | OUTPATIENT
Start: 2024-06-11

## 2024-06-18 NOTE — PROGRESS NOTES
"  Patient ID: Floresita Shannon \"Floresita Shannon\" is a 44 y.o. female who presents for No chief complaint on file..    Referring Provider: Clary Snow    Pt was referred for Estradiol patch 0.0375mg + Progesterone 100mg daily    Last visit with referring provider: 4/30/24    Preferred Pharmacy:    [facility]/pharmacy #[id number] - [place], [place] - [address] [address]  [address] [address] [address]  Phone: [phone] Fax: [phone]    [facility]  [address]  [place] [address]  Phone: [phone] Fax: [phone]      Copay assistance:   Do you have copays on your medications? no  Do you have trouble affording your current medications? N/a     Patient Assistance Screening (VAF)    Patient verbally reports monthly or yearly income which is less than 400% federal poverty level   Application for program has been submitted for the following medications:   Discussed, she will let me know if needed   Patient has been informed that program team will be reaching out to them to discuss necessary documentation, instructed to answer phone/return voicemail.   Patient aware this process may take up to 6 weeks.   If approved medication must be filled through [facility] pharmacy and may be picked up or mailed to patient.       Subjective      LMP: 12/01/23  Uterus: Yes      Any Contraindications and/or Cautions to HT:   PH/FH breast cancer: No  Estrogen sensitive?  PH/FH of ovarian cancer: No  PH/FH of uterine cancer: No  PH/FH of colon cancer: No  PH/FH of pancreatic cancer: No  PH/FH Dementia: No  Stroke, MI, VTE, clotting disorder, or congenital heart disease: No  Systemic lupus erythematosus (increased clot risk): No  H/o Gallbladder disease? No  Tobacco? No  Alcohol? No      Nutrition:   Goal: 21+ grams of fiber daily  Focus on whole foods, colorful, diversity  Omega-3's - low mercury, S.M.A.S.H. fish, 2 servings/week or supplement  Limit saturated fats, refined carbs/simple sugars, " "caffeine/alcohol    Movement:   Joint pain? No  Strength training? No    Sleep:   Daytime brain fog/memory troubles? A little    Stress:   2 or 3 out of 10 (since starting anxiety medication)    GI issues? No  Frequency of BM? Daily      Vaginal Symptoms  Dyspareunia (pain before/during/after intercourse): No  Vaginal Bleeding: No  Vaginal Dryness: No  Dysuria (pain, burning, stinging, or itching with urination): No  Vaginal and/or vulvar irritation/itching: No  Recurrent urinary tract infections: No  Recurrent yeast infections: No  No results found for: \"ESTROGEN\", \"PROGESTERONE\"     Previous Treatment:   None    Vasomotor symptoms   Frequency:  Feels like she is burning up, wishes she had a freezer to walk into-4-5x/day. Occurring at night too, 1x/night. Soaking through clothes.     Previous Treatment:   None  Recommended Treatment:   Estradiol 0.0375 patch + protesterone  Lab Results   Component Value Date    AST 12 08/02/2023    AST 14 06/02/2023    AST 13 06/22/2022    ALT 9 08/02/2023    ALT 10 06/02/2023    ALT 14 06/22/2022       Libido  \"I don't have any\"     Urinary Incontinence  Needing to use the bathroom more often.  Frequency:  Yes  Urgency:  Yes  Incontinence: No      Lab Results   Component Value Date    CREATININE 0.60 02/21/2024       Bone Health  Osteopenia or osteoporosis: No  Fracture history: No     Cardiovascular Health  The ASCVD Risk score (Clarita MERCHANT, et al., 2019) failed to calculate for the following reasons:    The patient has a prior MI or stroke diagnosis    Lab Results   Component Value Date    CHOL 193 03/14/2024     Lab Results   Component Value Date    HDL 72.7 03/14/2024     Lab Results   Component Value Date    LDLCALC 99 06/22/2022     Lab Results   Component Value Date    TRIG 45 06/22/2022     No components found for: \"CHOLHDL\"   BP Readings from Last 3 Encounters:   04/30/24 120/78   03/14/24 128/72   02/29/24 128/86        Blood Sugar Balance  Lab Results   Component Value " Date    GLUCOSE 92 02/21/2024    HGBA1C 5.4 03/14/2024    HGBA1C 5.1 06/29/2021    HGBA1C 5.4 06/01/2020       Thyroid  Lab Results   Component Value Date    TSH 1.05 03/14/2024        Iron Status  Lab Results   Component Value Date    IRON 126 08/02/2023    TIBC 285 08/02/2023    FERRITIN 34 08/02/2023        Potassium  Lab Results   Component Value Date    K 3.6 02/21/2024        Vitamin D3  Lab Results   Component Value Date    VITD25 39 08/02/2023       Current Outpatient Medications on File Prior to Visit   Medication Sig Dispense Refill    calcium citrate (Calcitrate) 200 mg (950 mg) tablet Take 1 tablet (200 mg) by mouth once daily.      estradiol (Vivelle-DOT) 0.0375 mg/24 hr Place 1 patch over 96 hours on the skin 2 times a week. 24 patch 3    multivitamin tablet Take 1 tablet by mouth once daily.      pantoprazole (ProtoNix) 40 mg EC tablet Take 1 tablet (40 mg) by mouth once daily in the morning. Take before meals. Do not crush, chew, or split. Do not start before February 23, 2024. 30 tablet 0    progesterone (Prometrium) 100 mg capsule Take 1 capsule (100 mg) by mouth once daily. For 14 days on then 14 days off 40 capsule 3    sertraline (Zoloft) 25 mg tablet Take 1 tablet (25 mg) by mouth once daily. 30 tablet 2    valACYclovir (Valtrex) 500 mg tablet Take 1 tablet (500 mg) by mouth once daily. 30 tablet 11     No current facility-administered medications on file prior to visit.        Medication and allergy reconciliation completed     Drug Interactions   No significant drug interactions identified    Assessment/Plan     Patient was experiencing GSM including vasomotor symptoms and loss of libido.   Patient has noticed improvement, 1-2 hot flashes and only occasional night sweats (no longer nightly).   No side effects.   Patient has tried/failed: none  Patient is <60 years of age or within 10 years of menopause without contraindications to hormone therapy (eg, breast cancer).   Plan is to start  transdermal estradiol and to increase if needed approximately every 4 weeks as needed to relieve symptoms.       CONTINUE  Vivelle Dot patch 0.037mg apply/remove twice weekly (every 3-4 days)  Progesterone 100 daily at bedtime for 14 days on then 14 days off      Follow-up: 9/18/24 2pm     Time spent with pt: Total length of time 15 (minutes) of the encounter and more than 50% was spent counseling the patient.      Kristen Diaz, Pharm.D, [facility], [facility]  Clinical Pharmacist  Pharmacy Services  [phone]    Continue all meds under the continuation of care with the referring provider and clinical pharmacy team.    Verbal consent to manage patient's drug therapy was obtained from the patient and/or an individual authorized to act on behalf of a patient. They were informed they may decline to participate or withdraw from participation in pharmacy services at any time.

## 2024-06-19 ENCOUNTER — APPOINTMENT (OUTPATIENT)
Dept: PHARMACY | Facility: HOSPITAL | Age: 44
End: 2024-06-19
Payer: COMMERCIAL

## 2024-06-19 DIAGNOSIS — N95.1 PERIMENOPAUSE: Primary | ICD-10-CM

## 2024-07-02 ENCOUNTER — TELEPHONE (OUTPATIENT)
Dept: SURGERY | Facility: CLINIC | Age: 44
End: 2024-07-02
Payer: COMMERCIAL

## 2024-07-08 DIAGNOSIS — K90.9 INTESTINAL MALABSORPTION, UNSPECIFIED TYPE (HHS-HCC): Primary | ICD-10-CM

## 2024-07-09 PROCEDURE — RXMED WILLOW AMBULATORY MEDICATION CHARGE

## 2024-07-11 ENCOUNTER — PHARMACY VISIT (OUTPATIENT)
Dept: PHARMACY | Facility: CLINIC | Age: 44
End: 2024-07-11
Payer: MEDICAID

## 2024-07-24 ENCOUNTER — LAB (OUTPATIENT)
Dept: LAB | Facility: LAB | Age: 44
End: 2024-07-24
Payer: COMMERCIAL

## 2024-07-24 DIAGNOSIS — K90.9 INTESTINAL MALABSORPTION, UNSPECIFIED TYPE (HHS-HCC): ICD-10-CM

## 2024-07-24 LAB
25(OH)D3 SERPL-MCNC: 35 NG/ML (ref 31–100)
ALBUMIN SERPL-MCNC: 4.6 G/DL (ref 3.5–5)
ALP BLD-CCNC: 57 U/L (ref 35–125)
ALT SERPL-CCNC: 13 U/L (ref 5–40)
ANION GAP SERPL CALC-SCNC: 11 MMOL/L
AST SERPL-CCNC: 16 U/L (ref 5–40)
BASOPHILS # BLD AUTO: 0.02 X10*3/UL (ref 0–0.1)
BASOPHILS NFR BLD AUTO: 0.4 %
BILIRUB SERPL-MCNC: <0.2 MG/DL (ref 0.1–1.2)
BUN SERPL-MCNC: 17 MG/DL (ref 8–25)
CALCIUM SERPL-MCNC: 10.1 MG/DL (ref 8.5–10.4)
CHLORIDE SERPL-SCNC: 101 MMOL/L (ref 97–107)
CO2 SERPL-SCNC: 28 MMOL/L (ref 24–31)
CREAT SERPL-MCNC: 0.8 MG/DL (ref 0.4–1.6)
EGFRCR SERPLBLD CKD-EPI 2021: >90 ML/MIN/1.73M*2
EOSINOPHIL # BLD AUTO: 0.02 X10*3/UL (ref 0–0.7)
EOSINOPHIL NFR BLD AUTO: 0.4 %
ERYTHROCYTE [DISTWIDTH] IN BLOOD BY AUTOMATED COUNT: 13.3 % (ref 11.5–14.5)
FERRITIN SERPL-MCNC: 17 NG/ML (ref 13–150)
FOLATE SERPL-MCNC: >20 NG/ML (ref 4.2–19.9)
GLUCOSE SERPL-MCNC: 83 MG/DL (ref 65–99)
HCT VFR BLD AUTO: 34.4 % (ref 36–46)
HGB BLD-MCNC: 10.8 G/DL (ref 12–16)
IMM GRANULOCYTES # BLD AUTO: 0.02 X10*3/UL (ref 0–0.7)
IMM GRANULOCYTES NFR BLD AUTO: 0.4 % (ref 0–0.9)
IRON SATN MFR SERPL: 12 % (ref 12–50)
IRON SERPL-MCNC: 43 UG/DL (ref 30–160)
LYMPHOCYTES # BLD AUTO: 2.42 X10*3/UL (ref 1.2–4.8)
LYMPHOCYTES NFR BLD AUTO: 42.9 %
MCH RBC QN AUTO: 28.5 PG (ref 26–34)
MCHC RBC AUTO-ENTMCNC: 31.4 G/DL (ref 32–36)
MCV RBC AUTO: 91 FL (ref 80–100)
MONOCYTES # BLD AUTO: 0.33 X10*3/UL (ref 0.1–1)
MONOCYTES NFR BLD AUTO: 5.9 %
NEUTROPHILS # BLD AUTO: 2.83 X10*3/UL (ref 1.2–7.7)
NEUTROPHILS NFR BLD AUTO: 50 %
NRBC BLD-RTO: 0 /100 WBCS (ref 0–0)
PLATELET # BLD AUTO: 225 X10*3/UL (ref 150–450)
POTASSIUM SERPL-SCNC: 4.5 MMOL/L (ref 3.4–5.1)
PROT SERPL-MCNC: 7.1 G/DL (ref 5.9–7.9)
PTH-INTACT SERPL-MCNC: 91.7 PG/ML (ref 18.5–88)
RBC # BLD AUTO: 3.79 X10*6/UL (ref 4–5.2)
SODIUM SERPL-SCNC: 140 MMOL/L (ref 133–145)
TIBC SERPL-MCNC: 362 UG/DL (ref 228–428)
UIBC SERPL-MCNC: 319 UG/DL (ref 110–370)
VIT B12 SERPL-MCNC: 696 PG/ML (ref 211–946)
WBC # BLD AUTO: 5.6 X10*3/UL (ref 4.4–11.3)

## 2024-07-24 PROCEDURE — 85025 COMPLETE CBC W/AUTO DIFF WBC: CPT

## 2024-07-24 PROCEDURE — 84630 ASSAY OF ZINC: CPT

## 2024-07-24 PROCEDURE — 82525 ASSAY OF COPPER: CPT

## 2024-07-24 PROCEDURE — 83540 ASSAY OF IRON: CPT

## 2024-07-24 PROCEDURE — 82306 VITAMIN D 25 HYDROXY: CPT

## 2024-07-24 PROCEDURE — 82607 VITAMIN B-12: CPT

## 2024-07-24 PROCEDURE — 36415 COLL VENOUS BLD VENIPUNCTURE: CPT

## 2024-07-24 PROCEDURE — 80053 COMPREHEN METABOLIC PANEL: CPT

## 2024-07-24 PROCEDURE — 82728 ASSAY OF FERRITIN: CPT

## 2024-07-24 PROCEDURE — 82746 ASSAY OF FOLIC ACID SERUM: CPT

## 2024-07-24 PROCEDURE — 83550 IRON BINDING TEST: CPT

## 2024-07-24 PROCEDURE — 83970 ASSAY OF PARATHORMONE: CPT

## 2024-07-24 PROCEDURE — 84425 ASSAY OF VITAMIN B-1: CPT

## 2024-07-26 LAB
COPPER SERPL-MCNC: 135.6 UG/DL (ref 80–155)
ZINC SERPL-MCNC: 69.7 UG/DL (ref 60–120)

## 2024-07-30 ENCOUNTER — APPOINTMENT (OUTPATIENT)
Dept: PRIMARY CARE | Facility: CLINIC | Age: 44
End: 2024-07-30
Payer: COMMERCIAL

## 2024-07-30 LAB — VIT B1 PYROPHOSHATE BLD-SCNC: 95 NMOL/L (ref 70–180)

## 2024-07-31 ENCOUNTER — APPOINTMENT (OUTPATIENT)
Dept: SURGERY | Facility: CLINIC | Age: 44
End: 2024-07-31
Payer: COMMERCIAL

## 2024-08-02 PROCEDURE — RXMED WILLOW AMBULATORY MEDICATION CHARGE

## 2024-08-05 ENCOUNTER — PHARMACY VISIT (OUTPATIENT)
Dept: PHARMACY | Facility: CLINIC | Age: 44
End: 2024-08-05
Payer: MEDICAID

## 2024-08-27 ENCOUNTER — APPOINTMENT (OUTPATIENT)
Dept: SURGERY | Facility: CLINIC | Age: 44
End: 2024-08-27
Payer: COMMERCIAL

## 2024-08-28 ENCOUNTER — TELEPHONE (OUTPATIENT)
Dept: SURGERY | Facility: CLINIC | Age: 44
End: 2024-08-28
Payer: COMMERCIAL

## 2024-08-29 ENCOUNTER — APPOINTMENT (OUTPATIENT)
Dept: SURGERY | Facility: CLINIC | Age: 44
End: 2024-08-29
Payer: COMMERCIAL

## 2024-08-29 PROCEDURE — RXMED WILLOW AMBULATORY MEDICATION CHARGE

## 2024-08-31 ENCOUNTER — PHARMACY VISIT (OUTPATIENT)
Dept: PHARMACY | Facility: CLINIC | Age: 44
End: 2024-08-31
Payer: MEDICAID

## 2024-09-05 DIAGNOSIS — F41.9 ANXIETY: ICD-10-CM

## 2024-09-05 RX ORDER — SERTRALINE HYDROCHLORIDE 25 MG/1
25 TABLET, FILM COATED ORAL DAILY
Qty: 90 TABLET | Refills: 0 | Status: SHIPPED | OUTPATIENT
Start: 2024-09-05

## 2024-09-17 NOTE — CARE PLAN
"Wound Debridement    Date/Time: 9/17/2024 8:40 AM    Performed by: Jacob Bernal DPM  Authorized by: Jacob Bernal DPM    Time out: Immediately prior to procedure a "time out" was called to verify the correct patient, procedure, equipment, support staff and site/side marked as required.    Consent Done?:  Yes (Verbal)  Local anesthesia used?: No      Wound Details:    Location:  Right foot    Location:  Right 5th Metatarsal Head    Type of Debridement:  Excisional       Length (cm):  1       Area (sq cm):  0.5       Width (cm):  0.5       Percent Debrided (%):  100       Depth (cm):  0.1       Total Area Debrided (sq cm):  0.5    Depth of debridement:  Epidermis/Dermis    Tissue debrided:  Dermis    Devitalized tissue debrided:  Fibrin    Instruments:  Blade  Bleeding:  Minimal  Hemostasis Achieved: Yes  Method Used:  Pressure    2nd Wound Details:     Location:  Left foot    Location:  Left 2nd Metatarsal Head    Location:  Left 2nd Metatarsal Head    Type of Debridement:  Excisional       Length (cm):  0.3       Area (sq cm):  0.09       Width (cm):  0.3       Percent Debrided (%):  100       Depth (cm):  0.1       Total Area Debrided (sq cm):  0.09    Depth of debridement:  Epidermis/Dermis    Tissue debrided:  Dermis    Devitalized tissue debrided:  Fibrin    Instruments:  Blade  Bleeding:  Minimal  Hemostasis Achieved: Yes    Method Used:  Pressure  Patient tolerance:  Patient tolerated the procedure well with no immediate complications    " The patient's goals for the shift include      The clinical goals for the shift include      Over the shift, the patient did not make progress toward the following goals. Barriers to progression include . Recommendations to address these barriers include .

## 2024-09-18 ENCOUNTER — APPOINTMENT (OUTPATIENT)
Dept: PHARMACY | Facility: HOSPITAL | Age: 44
End: 2024-09-18
Payer: COMMERCIAL

## 2024-09-18 DIAGNOSIS — N95.1 PERIMENOPAUSE: Primary | ICD-10-CM

## 2024-09-18 NOTE — PROGRESS NOTES
"  Patient ID: Floresita Shannon \"Floresita Shannon\" is a 44 y.o. female who presents for No chief complaint on file..    Referring Provider: Clary Snow    Pt was referred for Estradiol patch 0.0375mg + Progesterone 100mg daily    Last visit with referring provider: 4/30/24    Preferred Pharmacy:    [facility]/pharmacy #[id number] - [place], [place] - [address] [address]  [address] [address] [address]  Phone: [phone] Fax: [phone]    [facility]  [address], [address]  [facility] [address]  Phone: [phone] Fax: [phone]      Copay assistance:   Do you have copays on your medications? no  Do you have trouble affording your current medications? N/a     Patient Assistance Screening (VAF)    Patient verbally reports monthly or yearly income which is less than 400% federal poverty level   Application for program has been submitted for the following medications:   Discussed, she will let me know if needed   Patient has been informed that program team will be reaching out to them to discuss necessary documentation, instructed to answer phone/return voicemail.   Patient aware this process may take up to 6 weeks.   If approved medication must be filled through [facility] pharmacy and may be picked up or mailed to patient.       Subjective      LMP: 12/01/23  Uterus: Yes      Any Contraindications and/or Cautions to HT:   PH/FH breast cancer: No  Estrogen sensitive?  PH/FH of ovarian cancer: No  PH/FH of uterine cancer: No  PH/FH of colon cancer: No  PH/FH of pancreatic cancer: No  PH/FH Dementia: No  Stroke, MI, VTE, clotting disorder, or congenital heart disease: No  Systemic lupus erythematosus (increased clot risk): No  H/o Gallbladder disease? No  Tobacco? No  Alcohol? No      Nutrition:   Goal: 21+ grams of fiber daily  Focus on whole foods, colorful, diversity  Omega-3's - low mercury, S.M.A.S.H. fish, 2 servings/week or supplement  Limit saturated fats, refined carbs/simple sugars, " "caffeine/alcohol    Movement:   Joint pain? No  Strength training? No    Sleep:   Daytime brain fog/memory troubles? A little    Stress:   2 or 3 out of 10 (since starting anxiety medication)    GI issues? No  Frequency of BM? Daily      Vaginal Symptoms  Dyspareunia (pain before/during/after intercourse): No  Vaginal Bleeding: No  Vaginal Dryness: No  Dysuria (pain, burning, stinging, or itching with urination): No  Vaginal and/or vulvar irritation/itching: No  Recurrent urinary tract infections: No  Recurrent yeast infections: No  No results found for: \"ESTROGEN\", \"PROGESTERONE\"     Previous Treatment:   None    Vasomotor symptoms   Frequency:  Feels like she is burning up, wishes she had a freezer to walk into-4-5x/day. Occurring at night too, 1x/night. Soaking through clothes.     Previous Treatment:   None  Recommended Treatment:   Estradiol 0.0375 patch + protesterone  Lab Results   Component Value Date    AST 16 07/24/2024    AST 12 08/02/2023    AST 14 06/02/2023    ALT 13 07/24/2024    ALT 9 08/02/2023    ALT 10 06/02/2023       Libido  \"I don't have any\"     Urinary Incontinence  Needing to use the bathroom more often.  Frequency:  Yes  Urgency:  Yes  Incontinence: No      Lab Results   Component Value Date    CREATININE 0.80 07/24/2024       Bone Health  Osteopenia or osteoporosis: No  Fracture history: No     Cardiovascular Health  The ASCVD Risk score (Clarita MERCHANT, et al., 2019) failed to calculate for the following reasons:    The patient has a prior MI or stroke diagnosis    Lab Results   Component Value Date    CHOL 193 03/14/2024     Lab Results   Component Value Date    HDL 72.7 03/14/2024     Lab Results   Component Value Date    LDLCALC 99 06/22/2022     Lab Results   Component Value Date    TRIG 45 06/22/2022     No components found for: \"CHOLHDL\"   BP Readings from Last 3 Encounters:   04/30/24 120/78   03/14/24 128/72   02/29/24 128/86        Blood Sugar Balance  Lab Results   Component Value " "Date    GLUCOSE 83 07/24/2024    HGBA1C 5.4 03/14/2024    HGBA1C 5.1 06/29/2021    HGBA1C 5.4 06/01/2020       Thyroid  Lab Results   Component Value Date    TSH 1.05 03/14/2024        Iron Status  Lab Results   Component Value Date    IRON 43 07/24/2024    TIBC 362 07/24/2024    FERRITIN 17 07/24/2024        Potassium  Lab Results   Component Value Date    K 4.5 07/24/2024        Vitamin D3  Lab Results   Component Value Date    VITD25 35 07/24/2024       Current Outpatient Medications on File Prior to Visit   Medication Sig Dispense Refill    calcium citrate (Calcitrate) 200 mg (950 mg) tablet Take 1 tablet (200 mg) by mouth once daily.      estradiol (Vivelle-DOT) 0.0375 mg/24 hr Place 1 patch over 96 hours on the skin 2 times a week. 24 patch 3    multivitamin tablet Take 1 tablet by mouth once daily.      pantoprazole (ProtoNix) 40 mg EC tablet Take 1 tablet (40 mg) by mouth once daily in the morning. Take before meals. Do not crush, chew, or split. Do not start before February 23, 2024. 30 tablet 0    progesterone (Prometrium) 100 mg capsule Take 1 capsule (100 mg) by mouth once daily. For 14 days on then 14 days off 40 capsule 3    sertraline (Zoloft) 25 mg tablet TAKE 1 TABLET BY MOUTH EVERY DAY 90 tablet 0    valACYclovir (Valtrex) 500 mg tablet Take 1 tablet (500 mg) by mouth once daily. 30 tablet 11     No current facility-administered medications on file prior to visit.        Medication and allergy reconciliation completed     Drug Interactions   No significant drug interactions identified    Assessment/Plan     Patient was experiencing GSM including vasomotor symptoms and loss of libido.   Patient has noticed light spotting this month and last month, but otherwise \"everything is going well\"   No hot flashes or night sweats for the past 4-5 weeks.   Sleep is \"fine\" no troubles.   Patient has tried/failed: none  Patient is <60 years of age or within 10 years of menopause without contraindications to " hormone therapy (eg, breast cancer).   Plan is to start transdermal estradiol and to increase if needed approximately every 4 weeks as needed to relieve symptoms.       CONTINUE  Vivelle Dot patch 0.037mg apply/remove twice weekly (every 3-4 days)  Progesterone 100 daily at bedtime for 14 days on then 14 days off      Follow-up: 3/18/25 2pm     Time spent with pt: Total length of time 15 (minutes) of the encounter and more than 50% was spent counseling the patient.      Kristen Diaz, Pharm.D, [facility], [facility]  Clinical Pharmacist  Pharmacy Services  [phone]    Continue all meds under the continuation of care with the referring provider and clinical pharmacy team.    Verbal consent to manage patient's drug therapy was obtained from the patient and/or an individual authorized to act on behalf of a patient. They were informed they may decline to participate or withdraw from participation in pharmacy services at any time.

## 2024-09-26 PROCEDURE — RXMED WILLOW AMBULATORY MEDICATION CHARGE

## 2024-09-27 ENCOUNTER — PHARMACY VISIT (OUTPATIENT)
Dept: PHARMACY | Facility: CLINIC | Age: 44
End: 2024-09-27
Payer: MEDICAID

## 2024-10-16 ENCOUNTER — APPOINTMENT (OUTPATIENT)
Dept: PRIMARY CARE | Facility: CLINIC | Age: 44
End: 2024-10-16
Payer: COMMERCIAL

## 2024-10-21 PROCEDURE — RXMED WILLOW AMBULATORY MEDICATION CHARGE

## 2024-10-23 ENCOUNTER — PHARMACY VISIT (OUTPATIENT)
Dept: PHARMACY | Facility: CLINIC | Age: 44
End: 2024-10-23
Payer: MEDICAID

## 2024-11-16 PROCEDURE — RXMED WILLOW AMBULATORY MEDICATION CHARGE

## 2024-11-20 ENCOUNTER — PHARMACY VISIT (OUTPATIENT)
Dept: PHARMACY | Facility: CLINIC | Age: 44
End: 2024-11-20
Payer: MEDICAID

## 2024-12-13 PROCEDURE — RXMED WILLOW AMBULATORY MEDICATION CHARGE

## 2024-12-16 ENCOUNTER — PHARMACY VISIT (OUTPATIENT)
Dept: PHARMACY | Facility: CLINIC | Age: 44
End: 2024-12-16
Payer: MEDICAID

## 2024-12-23 DIAGNOSIS — F41.9 ANXIETY: ICD-10-CM

## 2024-12-28 RX ORDER — SERTRALINE HYDROCHLORIDE 25 MG/1
25 TABLET, FILM COATED ORAL DAILY
Qty: 90 TABLET | Refills: 0 | Status: SHIPPED | OUTPATIENT
Start: 2024-12-28

## 2025-01-08 PROCEDURE — RXMED WILLOW AMBULATORY MEDICATION CHARGE

## 2025-01-13 ENCOUNTER — PHARMACY VISIT (OUTPATIENT)
Dept: PHARMACY | Facility: CLINIC | Age: 45
End: 2025-01-13
Payer: MEDICAID

## 2025-01-16 ENCOUNTER — APPOINTMENT (OUTPATIENT)
Dept: PRIMARY CARE | Facility: CLINIC | Age: 45
End: 2025-01-16
Payer: COMMERCIAL

## 2025-02-05 DIAGNOSIS — N95.1 PERIMENOPAUSE: ICD-10-CM

## 2025-02-05 PROCEDURE — RXMED WILLOW AMBULATORY MEDICATION CHARGE

## 2025-02-05 RX ORDER — PROGESTERONE 100 MG/1
100 CAPSULE ORAL DAILY
Qty: 40 CAPSULE | Refills: 0 | Status: SHIPPED | OUTPATIENT
Start: 2025-02-05 | End: 2025-03-17

## 2025-02-06 ENCOUNTER — PHARMACY VISIT (OUTPATIENT)
Dept: PHARMACY | Facility: CLINIC | Age: 45
End: 2025-02-06
Payer: MEDICAID

## 2025-03-03 PROCEDURE — RXMED WILLOW AMBULATORY MEDICATION CHARGE

## 2025-03-05 ENCOUNTER — PHARMACY VISIT (OUTPATIENT)
Dept: PHARMACY | Facility: CLINIC | Age: 45
End: 2025-03-05
Payer: MEDICAID

## 2025-03-18 ENCOUNTER — APPOINTMENT (OUTPATIENT)
Dept: PHARMACY | Facility: HOSPITAL | Age: 45
End: 2025-03-18
Payer: COMMERCIAL

## 2025-03-18 DIAGNOSIS — N95.1 PERIMENOPAUSE: ICD-10-CM

## 2025-03-18 RX ORDER — PROGESTERONE 100 MG/1
100 CAPSULE ORAL DAILY
Qty: 90 CAPSULE | Refills: 3 | Status: SHIPPED | OUTPATIENT
Start: 2025-03-18 | End: 2026-03-13

## 2025-03-18 RX ORDER — ESTRADIOL 0.04 MG/D
1 FILM, EXTENDED RELEASE TRANSDERMAL 2 TIMES WEEKLY
Qty: 24 PATCH | Refills: 3 | Status: SHIPPED | OUTPATIENT
Start: 2025-03-20 | End: 2026-03-20

## 2025-03-18 NOTE — Clinical Note
Patient says she's having a hard time getting into you. She's had multiple times where she's gotten calls and they've rescheduled her. :) I just wanted to give you a heads up.   Warmly,   Kristen

## 2025-03-18 NOTE — PROGRESS NOTES
"  Patient ID: Floresita Shannon \"Floresita Shannon\" is a 44 y.o. female who presents for No chief complaint on file..    Referring Provider: Clary Snow    Pt was referred for Estradiol patch 0.0375mg + Progesterone 100mg daily    Last visit with referring provider: 4/30/24    Preferred Pharmacy:    [id number] - [place], [place] - [address] [facility]  [address] [address] [address]  Phone: [phone] Fax: [phone]    [facility]  [address], [address]  [facility] [address]  Phone: [phone] Fax: [phone]      Copay assistance:   Do you have copays on your medications? no  Do you have trouble affording your current medications? N/a     Patient Assistance Screening (VAF)    Patient verbally reports monthly or yearly income which is less than 400% federal poverty level   Application for program has been submitted for the following medications:   Discussed, she will let me know if needed   Patient has been informed that program team will be reaching out to them to discuss necessary documentation, instructed to answer phone/return voicemail.   Patient aware this process may take up to 6 weeks.   If approved medication must be filled through [facility] pharmacy and may be picked up or mailed to patient.       Subjective      LMP: 12/01/23  Uterus: Yes      Any Contraindications and/or Cautions to HT:   PH/FH breast cancer: No  Estrogen sensitive?  PH/FH of ovarian cancer: No  PH/FH of uterine cancer: No  PH/FH of colon cancer: No  PH/FH of pancreatic cancer: No  PH/FH Dementia: No  Stroke, MI, VTE, clotting disorder, or congenital heart disease: No  Systemic lupus erythematosus (increased clot risk): No  H/o Gallbladder disease? No  Tobacco? No  Alcohol? No      Nutrition:   Goal: 21+ grams of fiber daily  Focus on whole foods, colorful, diversity  Omega-3's - low mercury, S.M.A.S.H. fish, 2 servings/week or supplement  Limit saturated fats, refined carbs/simple sugars, " "caffeine/alcohol    Movement:   Joint pain? No  Strength training? No    Sleep:   Daytime brain fog/memory troubles? A little    Stress:   2 or 3 out of 10 (since starting anxiety medication)    GI issues? No  Frequency of BM? Daily      Vaginal Symptoms  Dyspareunia (pain before/during/after intercourse): No  Vaginal Bleeding: No  Vaginal Dryness: No  Dysuria (pain, burning, stinging, or itching with urination): No  Vaginal and/or vulvar irritation/itching: No  Recurrent urinary tract infections: No  Recurrent yeast infections: No  No results found for: \"ESTROGEN\", \"PROGESTERONE\"     Previous Treatment:   None    Vasomotor symptoms   Frequency:  Feels like she is burning up, wishes she had a freezer to walk into-4-5x/day. Occurring at night too, 1x/night. Soaking through clothes.     Previous Treatment:   None  Recommended Treatment:   Estradiol 0.0375 patch + protesterone  Lab Results   Component Value Date    AST 16 07/24/2024    AST 12 08/02/2023    AST 14 06/02/2023    ALT 13 07/24/2024    ALT 9 08/02/2023    ALT 10 06/02/2023       Libido  \"I don't have any\"     Urinary Incontinence  Needing to use the bathroom more often.  Frequency:  Yes  Urgency:  Yes  Incontinence: No      Lab Results   Component Value Date    CREATININE 0.80 07/24/2024       Bone Health  Osteopenia or osteoporosis: No  Fracture history: No     Cardiovascular Health  The ASCVD Risk score (Clarita MERCHANT, et al., 2019) failed to calculate for the following reasons:    Risk score cannot be calculated because patient has a medical history suggesting prior/existing ASCVD    Lab Results   Component Value Date    CHOL 193 03/14/2024     Lab Results   Component Value Date    HDL 72.7 03/14/2024     Lab Results   Component Value Date    LDLCALC 99 06/22/2022     Lab Results   Component Value Date    TRIG 45 06/22/2022     No components found for: \"CHOLHDL\"   BP Readings from Last 3 Encounters:   04/30/24 120/78   03/14/24 128/72   02/29/24 128/86    "     Blood Sugar Balance  Lab Results   Component Value Date    GLUCOSE 83 07/24/2024    HGBA1C 5.4 03/14/2024    HGBA1C 5.1 06/29/2021    HGBA1C 5.4 06/01/2020       Thyroid  Lab Results   Component Value Date    TSH 1.05 03/14/2024        Iron Status  Lab Results   Component Value Date    IRON 43 07/24/2024    TIBC 362 07/24/2024    FERRITIN 17 07/24/2024        Potassium  Lab Results   Component Value Date    K 4.5 07/24/2024        Vitamin D3  Lab Results   Component Value Date    VITD25 35 07/24/2024       Current Outpatient Medications on File Prior to Visit   Medication Sig Dispense Refill    calcium citrate (Calcitrate) 200 mg (950 mg) tablet Take 1 tablet (200 mg) by mouth once daily.      estradiol (Vivelle-DOT) 0.0375 mg/24 hr Place 1 patch over 96 hours on the skin 2 times a week. 24 patch 3    multivitamin tablet Take 1 tablet by mouth once daily.      pantoprazole (ProtoNix) 40 mg EC tablet Take 1 tablet (40 mg) by mouth once daily in the morning. Take before meals. Do not crush, chew, or split. Do not start before February 23, 2024. 30 tablet 0    progesterone (Prometrium) 100 mg capsule Take 1 capsule (100 mg) by mouth once daily. For 14 days on then 14 days off 40 capsule 0    sertraline (Zoloft) 25 mg tablet TAKE 1 TABLET BY MOUTH EVERY DAY 90 tablet 0    valACYclovir (Valtrex) 500 mg tablet Take 1 tablet (500 mg) by mouth once daily. 30 tablet 11     No current facility-administered medications on file prior to visit.        Medication and allergy reconciliation completed     Drug Interactions   No significant drug interactions identified    Assessment/Plan     Patient was experiencing GSM including vasomotor symptoms and loss of libido.   Vivelle Dot  VMS have resolves  No side effects  Tolerating well  Patient has noticed light spotting lasting 5 days.   Progesterone  No side effects  Tolerating well.   Patient has tried/failed: none      CONTINUE  Vivelle Dot patch 0.037mg apply/remove twice  weekly (every 3-4 days)  Progesterone 100 daily at bedtime for 14 days on then 14 days off      Follow-up: 9/23/25 1:40pm     Time spent with pt: Total length of time 15 (minutes) of the encounter and more than 50% was spent counseling the patient.      Kristen Diaz, Pharm.D, [facility], [facility]  Clinical Pharmacist  Pharmacy Services  [phone]    Continue all meds under the continuation of care with the referring provider and clinical pharmacy team.    Verbal consent to manage patient's drug therapy was obtained from the patient and/or an individual authorized to act on behalf of a patient. They were informed they may decline to participate or withdraw from participation in pharmacy services at any time.

## 2025-03-31 PROCEDURE — RXMED WILLOW AMBULATORY MEDICATION CHARGE

## 2025-04-02 ENCOUNTER — PHARMACY VISIT (OUTPATIENT)
Dept: PHARMACY | Facility: CLINIC | Age: 45
End: 2025-04-02
Payer: MEDICAID

## 2025-04-07 ENCOUNTER — APPOINTMENT (OUTPATIENT)
Dept: ORTHOPEDIC SURGERY | Facility: CLINIC | Age: 45
End: 2025-04-07
Payer: COMMERCIAL

## 2025-04-07 VITALS — WEIGHT: 200 LBS | BODY MASS INDEX: 34.15 KG/M2 | HEIGHT: 64 IN

## 2025-04-07 DIAGNOSIS — G56.03 CARPAL TUNNEL SYNDROME, BILATERAL: ICD-10-CM

## 2025-04-07 PROCEDURE — L3908 WHO COCK-UP NONMOLDE PRE OTS: HCPCS | Performed by: ORTHOPAEDIC SURGERY

## 2025-04-07 PROCEDURE — 20526 THER INJECTION CARP TUNNEL: CPT | Performed by: ORTHOPAEDIC SURGERY

## 2025-04-07 PROCEDURE — 1036F TOBACCO NON-USER: CPT | Performed by: ORTHOPAEDIC SURGERY

## 2025-04-07 PROCEDURE — 3008F BODY MASS INDEX DOCD: CPT | Performed by: ORTHOPAEDIC SURGERY

## 2025-04-07 PROCEDURE — 99204 OFFICE O/P NEW MOD 45 MIN: CPT | Performed by: ORTHOPAEDIC SURGERY

## 2025-04-07 RX ORDER — LIDOCAINE HYDROCHLORIDE 10 MG/ML
0.5 INJECTION, SOLUTION INFILTRATION; PERINEURAL
Status: COMPLETED | OUTPATIENT
Start: 2025-04-07 | End: 2025-04-07

## 2025-04-07 RX ORDER — TRIAMCINOLONE ACETONIDE 40 MG/ML
20 INJECTION, SUSPENSION INTRA-ARTICULAR; INTRAMUSCULAR
Status: COMPLETED | OUTPATIENT
Start: 2025-04-07 | End: 2025-04-07

## 2025-04-07 RX ADMIN — LIDOCAINE HYDROCHLORIDE 0.5 ML: 10 INJECTION, SOLUTION INFILTRATION; PERINEURAL at 12:05

## 2025-04-07 RX ADMIN — TRIAMCINOLONE ACETONIDE 20 MG: 40 INJECTION, SUSPENSION INTRA-ARTICULAR; INTRAMUSCULAR at 12:05

## 2025-04-07 ASSESSMENT — PAIN - FUNCTIONAL ASSESSMENT: PAIN_FUNCTIONAL_ASSESSMENT: 0-10

## 2025-04-07 ASSESSMENT — PAIN SCALES - GENERAL: PAINLEVEL_OUTOF10: 5 - MODERATE PAIN

## 2025-04-07 NOTE — LETTER
April 7, 2025     Félix Abad MD  [address] [facility] [address] [facility]  [facility] [address]    Patient: Floresita Shannon   YOB: 1980   Date of Visit: 4/7/2025       Dear Dr. Félix Abad MD:    Thank you for referring Floresita Shannon to me for evaluation. Below are my notes for this consultation.  If you have questions, please do not hesitate to call me. I look forward to following your patient along with you.       Sincerely,     George Kang MD      CC: No Recipients  ______________________________________________________________________________________    [facility]  Hand and Upper Extremity Service  Initial evaluation / Consultation         Consult requested by Referring Physician: Dr. Medel     Chief Complaint: Bilateral hands          44 y.o left hand dominant otherwise healthy high school  female presenting for several year history of bilateral hand numbness and tingling, left worse than right. These symptoms have progressed over time. She was diagnosed with carpal tunnel syndrome  by her Primary care physician several years ago based on physical examination findings. She was referred to therapy which helped a little bit at that time. She has never had any diagnostic testing or provided braces or injections. She has symptoms aggravated by heavier activities and feels that her  strength is diminishing. She also reports frequent sleep disturbances. She has no history of diabetes or thyroid disorders.           Please refer to New Patient Intake Form scanned into patient's electronic record for self reported past medical history, past surgical history, medications, allergies, family history, social history and 10 point review of systems    Examination:  Constitutional: Oriented to person, place, and time.  Appears well-developed and well-nourished.  Head: Normocephalic and atraumatic.  Eyes: Pupils are equal,  round, and reactive to light.  Cardiovascular: Intact distal pulses.  Pulmonary/Chest/Breast: Effort normal. No respiratory distress.  Neurological: Alert and oriented to person, place, and time.  Skin: Skin is warm and dry.  Psychiatric: normal mood and affect.  Behavior is normal.  Musculoskeletal: Bilateral hands reveal normal appearance. No thenar atrophy. Full finger and thumb flexion without triggering. Positive Meño median nerve compression test bilaterally. Positive Tinel's sign  and positive Phalen's test bilaterally.         Impression:  Bilateral carpal tunnel syndrome        Plan: We have discussed the diagnosis of carpal tunnel syndrome  and I do believe she has this. We reviewed treatment options and we will provide her braces to wear at night while sleeping. We also provided bilateral carpal tunnel injections.       In Office Procedures Performed: Bilateral carpal tunnel injections     Hand / UE Inj/Asp: bilateral carpal tunnel for carpal tunnel syndrome on 4/7/2025 12:05 PM  Indications: pain and therapeutic  Details: 25 G needle, volar approach  Medications (Right): 20 mg triamcinolone acetonide 40 mg/mL; 0.5 mL lidocaine 10 mg/mL (1 %)  Medications (Left): 20 mg triamcinolone acetonide 40 mg/mL; 0.5 mL lidocaine 10 mg/mL (1 %)  Outcome: tolerated well, no immediate complications  Procedure, treatment alternatives, risks and benefits explained, specific risks discussed. Consent was given by the patient. Immediately prior to procedure a time out was called to verify the correct patient, procedure, equipment, support staff and site/side marked as required. Patient was prepped and draped in the usual sterile fashion.         Patient was prescribed a cock up her splint for tunnel syndrome. The patient has weakness, instability and/or deformity of their bilateral wrist which requires stabilization from this orthosis to improve their function.      Verbal and written instructions for the use, wear  schedule, cleaning and application of this item were given.  Patient was instructed that should the brace result in increased pain, decreased sensation, increased swelling, or an overall worsening of their medical condition, to please contact our office immediately.     Orthotic management and training was provided for skin care, modifications due to healing tissues, edema changes, interruption in skin integrity, and safety precautions with the orthosis.      Follow up: As needed              George Kang MD  [facility]  Department of Orthopaedic Surgery  Hand and Upper Extremity Reconstruction      Scribe Attestation  By signing my name below, I, Ailyn Savage , Scribe   attest that this documentation has been prepared under the direction and in the presence of Dr. George Kang.      Dictation performed with the use of voice recognition software.  Syntax and grammatical errors may exist.

## 2025-04-07 NOTE — PROGRESS NOTES
[facility]  Hand and Upper Extremity Service  Initial evaluation / Consultation         Consult requested by Referring Physician: Dr. Medel     Chief Complaint: Bilateral hands          44 y.o left hand dominant otherwise healthy high school  female presenting for several year history of bilateral hand numbness and tingling, left worse than right. These symptoms have progressed over time. She was diagnosed with carpal tunnel syndrome  by her Primary care physician several years ago based on physical examination findings. She was referred to therapy which helped a little bit at that time. She has never had any diagnostic testing or provided braces or injections. She has symptoms aggravated by heavier activities and feels that her  strength is diminishing. She also reports frequent sleep disturbances. She has no history of diabetes or thyroid disorders.           Please refer to New Patient Intake Form scanned into patient's electronic record for self reported past medical history, past surgical history, medications, allergies, family history, social history and 10 point review of systems    Examination:  Constitutional: Oriented to person, place, and time.  Appears well-developed and well-nourished.  Head: Normocephalic and atraumatic.  Eyes: Pupils are equal, round, and reactive to light.  Cardiovascular: Intact distal pulses.  Pulmonary/Chest/Breast: Effort normal. No respiratory distress.  Neurological: Alert and oriented to person, place, and time.  Skin: Skin is warm and dry.  Psychiatric: normal mood and affect.  Behavior is normal.  Musculoskeletal: Bilateral hands reveal normal appearance. No thenar atrophy. Full finger and thumb flexion without triggering. Positive Meño median nerve compression test bilaterally. Positive Tinel's sign  and positive Phalen's test bilaterally.         Impression:  Bilateral carpal tunnel syndrome        Plan: We have  discussed the diagnosis of carpal tunnel syndrome  and I do believe she has this. We reviewed treatment options and we will provide her braces to wear at night while sleeping. We also provided bilateral carpal tunnel injections.       In Office Procedures Performed: Bilateral carpal tunnel injections     Hand / UE Inj/Asp: bilateral carpal tunnel for carpal tunnel syndrome on 4/7/2025 12:05 PM  Indications: pain and therapeutic  Details: 25 G needle, volar approach  Medications (Right): 20 mg triamcinolone acetonide 40 mg/mL; 0.5 mL lidocaine 10 mg/mL (1 %)  Medications (Left): 20 mg triamcinolone acetonide 40 mg/mL; 0.5 mL lidocaine 10 mg/mL (1 %)  Outcome: tolerated well, no immediate complications  Procedure, treatment alternatives, risks and benefits explained, specific risks discussed. Consent was given by the patient. Immediately prior to procedure a time out was called to verify the correct patient, procedure, equipment, support staff and site/side marked as required. Patient was prepped and draped in the usual sterile fashion.         Patient was prescribed a cock up her splint for tunnel syndrome. The patient has weakness, instability and/or deformity of their bilateral wrist which requires stabilization from this orthosis to improve their function.      Verbal and written instructions for the use, wear schedule, cleaning and application of this item were given.  Patient was instructed that should the brace result in increased pain, decreased sensation, increased swelling, or an overall worsening of their medical condition, to please contact our office immediately.     Orthotic management and training was provided for skin care, modifications due to healing tissues, edema changes, interruption in skin integrity, and safety precautions with the orthosis.      Follow up: As needed              George Kang MD  [facility]  Department of Orthopaedic Surgery  Hand and Upper  Extremity Reconstruction      Scribe Attestation  By signing my name below, I, Chani Breen   attest that this documentation has been prepared under the direction and in the presence of Dr. George Kang.      Dictation performed with the use of voice recognition software.  Syntax and grammatical errors may exist.

## 2025-04-15 ENCOUNTER — APPOINTMENT (OUTPATIENT)
Dept: PRIMARY CARE | Facility: CLINIC | Age: 45
End: 2025-04-15
Payer: COMMERCIAL

## 2025-04-24 PROCEDURE — RXMED WILLOW AMBULATORY MEDICATION CHARGE

## 2025-05-01 ENCOUNTER — PHARMACY VISIT (OUTPATIENT)
Dept: PHARMACY | Facility: CLINIC | Age: 45
End: 2025-05-01
Payer: MEDICAID

## 2025-05-21 NOTE — PROGRESS NOTES
"Assessment/Plan   Problem List Items Addressed This Visit           ICD-10-CM    Well woman exam Z01.419     Other Visit Diagnoses         Codes      Encounter for screening mammogram for malignant neoplasm of breast    -  Primary Z12.31      Perimenopause     N95.1            Clary Snow, APRN-CNM     Subjective   Floresita Shannon \"Floresita Shannon\" is a 44 y.o. female who is here for a routine exam. Periods are absent x 1 year.  On MHT with good control.      Last pap 2022 NEG/NEG.  No hx AMBROSE 2/3 --> next pap 2027    ROS      There were no vitals taken for this visit.    General:   Alert and oriented x 3   Heart:  Thyroid: Regular rate, rhythm  Euthyroid, normal shape and size   Lungs:  Breast: Clear to auscultation bilaterally  Symmetrical, no skin changes/nipple discharge, redness, tenderness, no masses palpated bilaterally   Abdomen: Soft, non tender   Vulva: EGBUS normal   Vagina: Pink, normal discharge   Cervix: No CMT   Uterus: Normal shape, size   Adnexa: NT bilaterally       Thank you for coming to see me for your visit today and most importantly thank you for having a preventative visit.  If lab work was sent, you will see your test result via [employer]  Any prescriptions will be sent electronically to your pharmacy listed    I look forward to seeing you next year for your health care needs.  Please remember:   Sleep is important - make it a priority for at least 6 hours per night!   Exercise such as walking for 20 minutes per day is beneficial to your physical and mental health   Healthy diets can be expensive -- if you every feel like you are struggling to afford healthy food, please let me know as we have a very good program called [employer] For [employer] that is available to you   Decrease alcohol and tobacco.  A goal of less than 7 alcoholic drinks per week is recommended for risk reduction of breast and colon cancer by 38%!    Be well!  Lexi  "

## 2025-05-22 ENCOUNTER — OFFICE VISIT (OUTPATIENT)
Facility: CLINIC | Age: 45
End: 2025-05-22
Payer: COMMERCIAL

## 2025-05-22 VITALS
BODY MASS INDEX: 36.5 KG/M2 | SYSTOLIC BLOOD PRESSURE: 122 MMHG | WEIGHT: 213.8 LBS | HEIGHT: 64 IN | DIASTOLIC BLOOD PRESSURE: 84 MMHG

## 2025-05-22 DIAGNOSIS — Z01.419 WELL WOMAN EXAM: ICD-10-CM

## 2025-05-22 DIAGNOSIS — B00.9 HERPES: ICD-10-CM

## 2025-05-22 DIAGNOSIS — N95.1 PERIMENOPAUSE: ICD-10-CM

## 2025-05-22 DIAGNOSIS — Z12.31 ENCOUNTER FOR SCREENING MAMMOGRAM FOR MALIGNANT NEOPLASM OF BREAST: Primary | ICD-10-CM

## 2025-05-22 DIAGNOSIS — F41.9 ANXIETY: ICD-10-CM

## 2025-05-22 PROCEDURE — 3008F BODY MASS INDEX DOCD: CPT | Performed by: ADVANCED PRACTICE MIDWIFE

## 2025-05-22 PROCEDURE — 3074F SYST BP LT 130 MM HG: CPT | Performed by: ADVANCED PRACTICE MIDWIFE

## 2025-05-22 PROCEDURE — 3079F DIAST BP 80-89 MM HG: CPT | Performed by: ADVANCED PRACTICE MIDWIFE

## 2025-05-22 PROCEDURE — 99396 PREV VISIT EST AGE 40-64: CPT | Performed by: ADVANCED PRACTICE MIDWIFE

## 2025-05-22 PROCEDURE — 1036F TOBACCO NON-USER: CPT | Performed by: ADVANCED PRACTICE MIDWIFE

## 2025-05-22 RX ORDER — PROGESTERONE 100 MG/1
100 CAPSULE ORAL DAILY
Qty: 90 CAPSULE | Refills: 3 | Status: SHIPPED | OUTPATIENT
Start: 2025-05-22 | End: 2026-05-17

## 2025-05-22 RX ORDER — SERTRALINE HYDROCHLORIDE 25 MG/1
25 TABLET, FILM COATED ORAL DAILY
Qty: 30 TABLET | Refills: 11 | Status: SHIPPED | OUTPATIENT
Start: 2025-05-22 | End: 2025-06-21

## 2025-05-22 RX ORDER — VALACYCLOVIR HYDROCHLORIDE 500 MG/1
500 TABLET, FILM COATED ORAL DAILY
Qty: 30 TABLET | Refills: 11 | Status: SHIPPED | OUTPATIENT
Start: 2025-05-22

## 2025-05-22 RX ORDER — ESTRADIOL 0.05 MG/D
1 PATCH TRANSDERMAL WEEKLY
Qty: 4 PATCH | Refills: 11 | Status: SHIPPED | OUTPATIENT
Start: 2025-05-22 | End: 2025-06-21

## 2025-05-22 RX ORDER — VALACYCLOVIR HYDROCHLORIDE 500 MG/1
500 TABLET, FILM COATED ORAL 2 TIMES DAILY
COMMUNITY
End: 2025-05-22 | Stop reason: SDUPTHER

## 2025-05-22 ASSESSMENT — ENCOUNTER SYMPTOMS
LOSS OF SENSATION IN FEET: 0
OCCASIONAL FEELINGS OF UNSTEADINESS: 0
DEPRESSION: 0

## 2025-05-22 ASSESSMENT — PATIENT HEALTH QUESTIONNAIRE - PHQ9
2. FEELING DOWN, DEPRESSED OR HOPELESS: NOT AT ALL
SUM OF ALL RESPONSES TO PHQ9 QUESTIONS 1 AND 2: 0
1. LITTLE INTEREST OR PLEASURE IN DOING THINGS: NOT AT ALL

## 2025-05-22 ASSESSMENT — PAIN SCALES - GENERAL: PAINLEVEL_OUTOF10: 0-NO PAIN

## 2025-05-27 ENCOUNTER — HOSPITAL ENCOUNTER (OUTPATIENT)
Dept: RADIOLOGY | Facility: CLINIC | Age: 45
Discharge: HOME | End: 2025-05-27
Payer: COMMERCIAL

## 2025-05-27 VITALS — WEIGHT: 213.8 LBS | HEIGHT: 64 IN | BODY MASS INDEX: 36.5 KG/M2

## 2025-05-27 DIAGNOSIS — Z12.31 ENCOUNTER FOR SCREENING MAMMOGRAM FOR MALIGNANT NEOPLASM OF BREAST: ICD-10-CM

## 2025-05-27 PROCEDURE — 77067 SCR MAMMO BI INCL CAD: CPT | Performed by: RADIOLOGY

## 2025-05-27 PROCEDURE — 77063 BREAST TOMOSYNTHESIS BI: CPT

## 2025-05-27 PROCEDURE — 77063 BREAST TOMOSYNTHESIS BI: CPT | Performed by: RADIOLOGY

## 2025-06-17 ENCOUNTER — APPOINTMENT (OUTPATIENT)
Dept: PRIMARY CARE | Facility: CLINIC | Age: 45
End: 2025-06-17
Payer: COMMERCIAL

## 2025-07-02 ENCOUNTER — APPOINTMENT (OUTPATIENT)
Dept: PRIMARY CARE | Facility: CLINIC | Age: 45
End: 2025-07-02
Payer: COMMERCIAL

## 2025-07-02 VITALS
BODY MASS INDEX: 38.35 KG/M2 | WEIGHT: 223.4 LBS | DIASTOLIC BLOOD PRESSURE: 82 MMHG | TEMPERATURE: 97.5 F | HEART RATE: 78 BPM | SYSTOLIC BLOOD PRESSURE: 122 MMHG

## 2025-07-02 DIAGNOSIS — Z00.00 ROUTINE PHYSICAL EXAMINATION: Primary | ICD-10-CM

## 2025-07-02 DIAGNOSIS — Z12.11 SCREEN FOR COLON CANCER: ICD-10-CM

## 2025-07-02 DIAGNOSIS — E66.01 MORBID OBESITY (MULTI): ICD-10-CM

## 2025-07-02 DIAGNOSIS — E55.9 VITAMIN D DEFICIENCY: ICD-10-CM

## 2025-07-02 DIAGNOSIS — I10 PRIMARY HYPERTENSION: ICD-10-CM

## 2025-07-02 PROCEDURE — 99213 OFFICE O/P EST LOW 20 MIN: CPT

## 2025-07-02 PROCEDURE — 1036F TOBACCO NON-USER: CPT

## 2025-07-02 PROCEDURE — 99396 PREV VISIT EST AGE 40-64: CPT

## 2025-07-02 PROCEDURE — 3079F DIAST BP 80-89 MM HG: CPT

## 2025-07-02 PROCEDURE — 3074F SYST BP LT 130 MM HG: CPT

## 2025-07-02 ASSESSMENT — COLUMBIA-SUICIDE SEVERITY RATING SCALE - C-SSRS
6. HAVE YOU EVER DONE ANYTHING, STARTED TO DO ANYTHING, OR PREPARED TO DO ANYTHING TO END YOUR LIFE?: NO
1. IN THE PAST MONTH, HAVE YOU WISHED YOU WERE DEAD OR WISHED YOU COULD GO TO SLEEP AND NOT WAKE UP?: NO
2. HAVE YOU ACTUALLY HAD ANY THOUGHTS OF KILLING YOURSELF?: NO

## 2025-07-02 NOTE — ASSESSMENT & PLAN NOTE
- Referral to endocrine for weight management  -Continue watching diet and exercise, education given on Mediterranean diet

## 2025-07-02 NOTE — PROGRESS NOTES
"Subjective   Patient ID: Floresita Shannon \"Floresita Shannon\" is a 45 y.o. female who presents for Establish Care and Weight Gain.    HPI  Patient here for physical exam and to establish care.  She is doing well and no complaints at this time.  She expresses difficulty losing weight since entering perimenopause.  Had the gastric sleeve in 2021 and lost 89 pounds.  Has been watching her diet and exercising 3 times weekly, but has been unable to lose weight.    Review of Systems  All pertinent positive symptoms are included in the history of present illness.  All other systems have been reviewed and are negative and noncontributory to this patient's current ailments.    Current Outpatient Medications   Medication Instructions    calcium citrate (Calcitrate) 200 mg (950 mg) tablet 1 tablet, Daily    estradiol (Climara) 0.05 mg/24 hr patch 1 patch, transdermal, Weekly    multivitamin tablet 1 tablet, Daily    pantoprazole (ProtoNix) 40 mg EC tablet Take 1 tablet (40 mg) by mouth once daily in the morning. Take before meals. Do not crush, chew, or split. Do not start before February 23, 2024.    progesterone (PROMETRIUM) 100 mg, oral, Daily, For 14 days on then 14 days off    sertraline (ZOLOFT) 25 mg, oral, Daily    valACYclovir (VALTREX) 500 mg, oral, Daily     Objective   Visit Vitals  /82   Pulse 78   Temp 36.4 °C (97.5 °F)   Wt 101 kg (223 lb 6.4 oz)   BMI 38.35 kg/m²   OB Status Perimenopausal   Smoking Status Never   BSA 2.14 m²       Physical Exam  CONSTITUTIONAL - well nourished, well developed, looks like stated age, in no acute distress.  SKIN - normal skin color and pigmentation, normal skin turgor without rash, lesions, or nodules visualized  HEAD - no trauma, normocephalic  EYES - pupils are equal and reactive to light, and extraocular muscles are intact  ENT - TM's intact, no signs of infection, uvula midline, and throat normal, no exudate, nasal passage without discharge.  NECK - supple without rigidity, " no neck mass was observed, no thyromegaly or thyroid nodules  CHEST - clear to auscultation, no wheezing, no crackles and no rales, good effort  CARDIAC - regular rate and regular rhythm, no skipped beats, no murmur  ABDOMEN - no organomegaly, soft, nontender, nondistended, normal bowel sounds, no guarding/rebound/rigidity.  EXTREMITIES - no obvious or evident edema, no obvious or evident deformities  NEUROLOGICAL - normal gait, normal balance, no ataxia, alert, oriented  PSYCHIATRIC - alert, pleasant and age-appropriate  IMMUNOLOGIC - no cervical lymphadenopathy     Assessment/Plan   Assessment & Plan  Primary hypertension  - Blood pressure at goal in office  Vitamin D deficiency  - Check vitamin D level today  Morbid obesity (Multi)  - Referral to endocrine for weight management  -Continue watching diet and exercise, education given on Mediterranean diet  Routine physical examination  -Health Maintenance:  -I have ordered lab work to be completed at this time. Please complete and my office will call you with the results.  -Please continue to eat a well-rounded diet rich in protein, fruits, and vegetables. Try to find food options that are low in sodium and carbohydrates.   -Try to increase your level of exercise by participating in physical activities for at least 30 minutes a day for at least 5 days per week. I gave you a pamphlet of exercises you may perform at home   -Please continue scheduling and attending all appointments with specialists per their request.      Orders Placed This Encounter   Procedures    Comprehensive Metabolic Panel    Lipid Panel    Hemoglobin A1C    Vitamin D 25-Hydroxy,Total (for eval of Vitamin D levels)    TSH with reflex to Free T4 if abnormal    CBC and Auto Differential    Referral to Endocrinology     1. HM  -CBC, CMP, Lipid panel, Vit D, TSH with reflex T4  -Vaccines:       Flu: Defer      Shingrix:  at 50      Pneumococcal: at 65      Tdap: 04/11/2018  -Kel w/ marilu:  05/27/2025  -Last PAP: per gyn  -DEXA: at 65  -Colonoscopy: ordered    Last MCR / CPE: 07/02/2025     Return to office in 1 year or sooner if needed.  If you should experience concerning symptoms, go to the nearest Emergency Department.      Farnaz Montemayor, APRN-CNP

## 2025-07-02 NOTE — ASSESSMENT & PLAN NOTE
-Health Maintenance:  -I have ordered lab work to be completed at this time. Please complete and my office will call you with the results.  -Please continue to eat a well-rounded diet rich in protein, fruits, and vegetables. Try to find food options that are low in sodium and carbohydrates.   -Try to increase your level of exercise by participating in physical activities for at least 30 minutes a day for at least 5 days per week. I gave you a pamphlet of exercises you may perform at home   -Please continue scheduling and attending all appointments with specialists per their request.

## 2025-07-08 ENCOUNTER — TELEPHONE (OUTPATIENT)
Dept: PRIMARY CARE | Facility: CLINIC | Age: 45
End: 2025-07-08
Payer: COMMERCIAL

## 2025-07-08 LAB
25(OH)D3+25(OH)D2 SERPL-MCNC: 34 NG/ML (ref 30–100)
ALBUMIN SERPL-MCNC: 4.2 G/DL (ref 3.6–5.1)
ALP SERPL-CCNC: 50 U/L (ref 31–125)
ALT SERPL-CCNC: 10 U/L (ref 6–29)
ANION GAP SERPL CALCULATED.4IONS-SCNC: 8 MMOL/L (CALC) (ref 7–17)
AST SERPL-CCNC: 13 U/L (ref 10–35)
BASOPHILS # BLD AUTO: 19 CELLS/UL (ref 0–200)
BASOPHILS NFR BLD AUTO: 0.4 %
BILIRUB SERPL-MCNC: 0.3 MG/DL (ref 0.2–1.2)
BUN SERPL-MCNC: 13 MG/DL (ref 7–25)
CALCIUM SERPL-MCNC: 9.7 MG/DL (ref 8.6–10.2)
CHLORIDE SERPL-SCNC: 104 MMOL/L (ref 98–110)
CHOLEST SERPL-MCNC: 217 MG/DL
CHOLEST/HDLC SERPL: 2.7 (CALC)
CO2 SERPL-SCNC: 28 MMOL/L (ref 20–32)
CREAT SERPL-MCNC: 0.64 MG/DL (ref 0.5–0.99)
EGFRCR SERPLBLD CKD-EPI 2021: 111 ML/MIN/1.73M2
EOSINOPHIL # BLD AUTO: 19 CELLS/UL (ref 15–500)
EOSINOPHIL NFR BLD AUTO: 0.4 %
ERYTHROCYTE [DISTWIDTH] IN BLOOD BY AUTOMATED COUNT: 13.3 % (ref 11–15)
EST. AVERAGE GLUCOSE BLD GHB EST-MCNC: 105 MG/DL
EST. AVERAGE GLUCOSE BLD GHB EST-SCNC: 5.8 MMOL/L
GLUCOSE SERPL-MCNC: 84 MG/DL (ref 65–99)
HBA1C MFR BLD: 5.3 %
HCT VFR BLD AUTO: 36.9 % (ref 35–45)
HDLC SERPL-MCNC: 79 MG/DL
HGB BLD-MCNC: 11.5 G/DL (ref 11.7–15.5)
LDLC SERPL CALC-MCNC: 124 MG/DL (CALC)
LYMPHOCYTES # BLD AUTO: 1853 CELLS/UL (ref 850–3900)
LYMPHOCYTES NFR BLD AUTO: 38.6 %
MCH RBC QN AUTO: 28.8 PG (ref 27–33)
MCHC RBC AUTO-ENTMCNC: 31.2 G/DL (ref 32–36)
MCV RBC AUTO: 92.5 FL (ref 80–100)
MONOCYTES # BLD AUTO: 336 CELLS/UL (ref 200–950)
MONOCYTES NFR BLD AUTO: 7 %
NEUTROPHILS # BLD AUTO: 2573 CELLS/UL (ref 1500–7800)
NEUTROPHILS NFR BLD AUTO: 53.6 %
NONHDLC SERPL-MCNC: 138 MG/DL (CALC)
PLATELET # BLD AUTO: 262 THOUSAND/UL (ref 140–400)
PMV BLD REES-ECKER: 11.2 FL (ref 7.5–12.5)
POTASSIUM SERPL-SCNC: 4.7 MMOL/L (ref 3.5–5.3)
PROT SERPL-MCNC: 6.8 G/DL (ref 6.1–8.1)
RBC # BLD AUTO: 3.99 MILLION/UL (ref 3.8–5.1)
SODIUM SERPL-SCNC: 140 MMOL/L (ref 135–146)
TRIGL SERPL-MCNC: 47 MG/DL
TSH SERPL-ACNC: 1.89 MIU/L
WBC # BLD AUTO: 4.8 THOUSAND/UL (ref 3.8–10.8)

## 2025-07-08 NOTE — TELEPHONE ENCOUNTER
Spoke to pt  ----- Message from Farnaz Montemayor sent at 7/8/2025  6:57 AM EDT -----  Please let her know we got the blood work back and her cholesterol is elevated. Lifestyle changes for elevated cholesterol levels include diet and exercise. For exercise, we recommend at least 150   mins of moderate intensity exercise in a week (enough that your heart rate picks up and you are sweating). For diet, the mediterranean diet has been shown to help. General recommendations beyond that   include having less red meat more chicken/turkey/fish, less butter more olive oil, and less fried food more fiber and vegetables.   ----- Message -----  From: Duyen "[employer] Results In  Sent: 7/7/2025  11:57 PM EDT  To: KOSTA CalderaCNP

## 2025-07-13 NOTE — PROGRESS NOTES
[facility]  Hand and Upper Extremity Service  Follow up visit         Follow up for: Bilateral hands     Interval History: She returns for her bilateral hands. She received bilateral carpal tunnel injections at last visit and did very well but over the last couple of weeks her symptoms started to return. She is here for repeat injections.               Past medical history, medications, allergies, surgical history and review of systems are reviewed and otherwise unchanged when compared to last visit on 4/7/25         Examination:  Constitutional: Oriented to person, place, and time.  Appears well-developed and well-nourished.  Head: Normocephalic and atraumatic.  Eyes: Pupils are equal, round, and reactive to light.  Cardiovascular: Intact distal pulses.  Pulmonary/Chest/Breast: Effort normal. No respiratory distress.  Neurological: Alert and oriented to person, place, and time.  Skin: Skin is warm and dry.  Psychiatric: normal mood and affect.  Behavior is normal.  Musculoskeletal: Bilateral hands reveal normal appearance. No thenar atrophy. Diminished sensation median nerve distribution. Positive Tinel's sign bilaterally.       Impression:  Bilateral carpal tunnel syndrome       Plan: We discussed that the fact that her injection worked so well is a strong confirmation of the underlying condition of carpal tunnel syndrome. Definitive treatment recommendations are for carpal tunnel decompression surgery which were discussed in detail today. However, she is not ready to discuss surgery at this point and has requested injections. We gave her bilateral carpal tunnel injections today and she will contact my office when her symptoms return, likely to schedule carpal tunnel decompression surgery under iv sedation and she will need to indicate which side she wants to do first at that time.       In Office Procedures Performed: Bilateral carpal tunnel injections  Hand / UE Inj/Asp:  bilateral carpal tunnel for carpal tunnel syndrome on 7/14/2025 2:11 PM  Indications: pain and therapeutic  Details: 25 G needle, volar approach  Medications (Right): 20 mg triamcinolone acetonide 40 mg/mL; 0.5 mL lidocaine 10 mg/mL (1 %)  Medications (Left): 20 mg triamcinolone acetonide 40 mg/mL; 0.5 mL lidocaine 10 mg/mL (1 %)  Outcome: tolerated well, no immediate complications  Procedure, treatment alternatives, risks and benefits explained, specific risks discussed. Consent was given by the patient. Immediately prior to procedure a time out was called to verify the correct patient, procedure, equipment, support staff and site/side marked as required. Patient was prepped and draped in the usual sterile fashion.       Follow up: As needed             George Kang MD  [facility]  Department of Orthopaedic Surgery  Hand and Upper Extremity Reconstruction    Scribe Attestation  By signing my name below, I, Ailyn Savage , [employer]   attest that this documentation has been prepared under the direction and in the presence of Dr. George Kang.    Dictation performed with the use of voice recognition software.  Syntax and grammatical errors may exist.

## 2025-07-14 ENCOUNTER — APPOINTMENT (OUTPATIENT)
Dept: ORTHOPEDIC SURGERY | Facility: CLINIC | Age: 45
End: 2025-07-14
Payer: COMMERCIAL

## 2025-07-14 DIAGNOSIS — G56.03 BILATERAL CARPAL TUNNEL SYNDROME: Primary | ICD-10-CM

## 2025-07-14 PROCEDURE — 99214 OFFICE O/P EST MOD 30 MIN: CPT | Performed by: ORTHOPAEDIC SURGERY

## 2025-07-14 PROCEDURE — 20526 THER INJECTION CARP TUNNEL: CPT | Performed by: ORTHOPAEDIC SURGERY

## 2025-07-14 RX ORDER — TRIAMCINOLONE ACETONIDE 40 MG/ML
20 INJECTION, SUSPENSION INTRA-ARTICULAR; INTRAMUSCULAR
Status: COMPLETED | OUTPATIENT
Start: 2025-07-14 | End: 2025-07-14

## 2025-07-14 RX ORDER — LIDOCAINE HYDROCHLORIDE 10 MG/ML
0.5 INJECTION, SOLUTION INFILTRATION; PERINEURAL
Status: COMPLETED | OUTPATIENT
Start: 2025-07-14 | End: 2025-07-14

## 2025-07-14 RX ADMIN — LIDOCAINE HYDROCHLORIDE 0.5 ML: 10 INJECTION, SOLUTION INFILTRATION; PERINEURAL at 14:11

## 2025-07-14 RX ADMIN — TRIAMCINOLONE ACETONIDE 20 MG: 40 INJECTION, SUSPENSION INTRA-ARTICULAR; INTRAMUSCULAR at 14:11

## 2025-08-06 ENCOUNTER — APPOINTMENT (OUTPATIENT)
Dept: GASTROENTEROLOGY | Facility: HOSPITAL | Age: 45
End: 2025-08-06
Payer: COMMERCIAL

## 2025-09-23 ENCOUNTER — APPOINTMENT (OUTPATIENT)
Dept: PHARMACY | Facility: HOSPITAL | Age: 45
End: 2025-09-23
Payer: COMMERCIAL

## 2026-07-02 ENCOUNTER — APPOINTMENT (OUTPATIENT)
Dept: PRIMARY CARE | Facility: CLINIC | Age: 46
End: 2026-07-02
Payer: COMMERCIAL

## (undated) DEVICE — INTRODUCER SHEATH, GLIDESHEATH, 6FR 25CM

## (undated) DEVICE — KIT, NAMIC STANDARD LEFT HEART, CUSTOM, LWMC

## (undated) DEVICE — PACK, ANGIO P2, CUSTOM, LAKE

## (undated) DEVICE — GUIDEWIRE, J TIP, 3 MM, 0.035 IN X 260 CM, PTFE

## (undated) DEVICE — CATHETER, OPTITORQUE, 6FR 110CM, TIGER RADIAL 4.0

## (undated) DEVICE — COVER, EQUIPMENT, ZERO GRAVITY

## (undated) DEVICE — CATHETER, EXPO, MODEL-D, 6FR PIG 110CM

## (undated) DEVICE — TR BAND, RADIAL COMPRESSION, STANDARD, 24CM